# Patient Record
Sex: MALE | Race: WHITE | NOT HISPANIC OR LATINO | Employment: FULL TIME | URBAN - METROPOLITAN AREA
[De-identification: names, ages, dates, MRNs, and addresses within clinical notes are randomized per-mention and may not be internally consistent; named-entity substitution may affect disease eponyms.]

---

## 2023-02-17 PROBLEM — R31.9 HEMATURIA: Status: ACTIVE | Noted: 2023-02-17

## 2023-02-17 PROBLEM — F10.10 ALCOHOL ABUSE: Status: ACTIVE | Noted: 2023-02-17

## 2023-02-17 PROBLEM — M40.209 KYPHOSIS, ACQUIRED: Status: ACTIVE | Noted: 2023-02-17

## 2023-02-17 PROBLEM — F13.939: Status: ACTIVE | Noted: 2023-02-17

## 2023-02-17 PROBLEM — R14.0 ABDOMINAL BLOATING: Status: ACTIVE | Noted: 2023-02-17

## 2023-02-17 PROBLEM — Z86.0100 PERSONAL HISTORY OF COLONIC POLYPS: Status: ACTIVE | Noted: 2023-02-17

## 2023-02-17 PROBLEM — N52.9 MALE ERECTILE DISORDER OF ORGANIC ORIGIN: Status: ACTIVE | Noted: 2023-02-17

## 2023-02-17 PROBLEM — R19.5 LOOSE BOWEL MOVEMENTS: Status: ACTIVE | Noted: 2023-02-17

## 2023-02-17 PROBLEM — J01.90 ACUTE SINUSITIS: Status: ACTIVE | Noted: 2023-02-17

## 2023-02-17 PROBLEM — D12.6 TUBULAR ADENOMA OF COLON: Status: ACTIVE | Noted: 2023-02-17

## 2023-02-17 PROBLEM — I10 ESSENTIAL HYPERTENSION: Status: ACTIVE | Noted: 2023-02-17

## 2023-02-17 PROBLEM — N13.8 BPH WITH OBSTRUCTION/LOWER URINARY TRACT SYMPTOMS: Status: ACTIVE | Noted: 2023-02-17

## 2023-02-17 PROBLEM — K64.4 EXTERNAL HEMORRHOIDS: Status: ACTIVE | Noted: 2023-02-17

## 2023-02-17 PROBLEM — E55.9 VITAMIN D DEFICIENCY: Status: ACTIVE | Noted: 2023-02-17

## 2023-02-17 PROBLEM — L30.9 ECZEMA: Status: ACTIVE | Noted: 2023-02-17

## 2023-02-17 PROBLEM — M85.80 OSTEOPENIA: Status: ACTIVE | Noted: 2023-02-17

## 2023-02-17 PROBLEM — Z86.010 PERSONAL HISTORY OF COLONIC POLYPS: Status: ACTIVE | Noted: 2023-02-17

## 2023-02-17 PROBLEM — R60.9 EDEMA: Status: ACTIVE | Noted: 2023-02-17

## 2023-02-17 PROBLEM — K58.9 IRRITABLE BOWEL SYNDROME: Status: ACTIVE | Noted: 2023-02-17

## 2023-02-17 PROBLEM — J30.9 ALLERGIC RHINITIS: Status: ACTIVE | Noted: 2023-02-17

## 2023-02-17 PROBLEM — J98.8 RESPIRATORY TRACT INFECTION DUE TO COVID-19 VIRUS: Status: ACTIVE | Noted: 2023-02-17

## 2023-02-17 PROBLEM — K57.30 DIVERTICULOSIS OF COLON: Status: ACTIVE | Noted: 2023-02-17

## 2023-02-17 PROBLEM — R93.1 AGATSTON CORONARY ARTERY CALCIUM SCORE BETWEEN 200 AND 399: Status: ACTIVE | Noted: 2023-02-17

## 2023-02-17 PROBLEM — Z97.3 WEARS GLASSES: Status: ACTIVE | Noted: 2023-02-17

## 2023-02-17 PROBLEM — M19.90 ARTHRITIS: Status: ACTIVE | Noted: 2023-02-17

## 2023-02-17 PROBLEM — K52.9 CHRONIC DIARRHEA: Status: ACTIVE | Noted: 2023-02-17

## 2023-02-17 PROBLEM — B35.6 TINEA CRURIS: Status: ACTIVE | Noted: 2023-02-17

## 2023-02-17 PROBLEM — U07.1 RESPIRATORY TRACT INFECTION DUE TO COVID-19 VIRUS: Status: ACTIVE | Noted: 2023-02-17

## 2023-02-17 PROBLEM — R43.0 ANOSMIA: Status: ACTIVE | Noted: 2023-02-17

## 2023-02-17 PROBLEM — M12.50 TRAUMATIC ARTHROPATHY: Status: ACTIVE | Noted: 2023-02-17

## 2023-02-17 PROBLEM — I25.10 ARTERIOSCLEROSIS OF CORONARY ARTERY: Status: ACTIVE | Noted: 2023-02-17

## 2023-02-17 PROBLEM — E78.5 HYPERLIPIDEMIA: Status: ACTIVE | Noted: 2023-02-17

## 2023-02-17 PROBLEM — N40.1 BPH WITH OBSTRUCTION/LOWER URINARY TRACT SYMPTOMS: Status: ACTIVE | Noted: 2023-02-17

## 2023-02-17 PROBLEM — D64.9 ANEMIA: Status: ACTIVE | Noted: 2023-02-17

## 2023-02-17 PROBLEM — R21 RASH: Status: ACTIVE | Noted: 2023-02-17

## 2023-02-17 PROBLEM — F10.20: Status: ACTIVE | Noted: 2023-02-17

## 2023-02-17 PROBLEM — F41.8 SITUATIONAL ANXIETY: Status: ACTIVE | Noted: 2023-02-17

## 2023-02-17 PROBLEM — E66.3 OVERWEIGHT (BMI 25.0-29.9): Status: ACTIVE | Noted: 2023-02-17

## 2023-02-17 PROBLEM — R31.29 MICROSCOPIC HEMATURIA: Status: ACTIVE | Noted: 2023-02-17

## 2023-02-17 PROBLEM — R82.998 CALCIUM OXALATE CRYSTALS IN URINE: Status: ACTIVE | Noted: 2023-02-17

## 2023-02-17 PROBLEM — J06.9 UPPER RESPIRATORY INFECTION, ACUTE: Status: ACTIVE | Noted: 2023-02-17

## 2023-02-17 RX ORDER — GABAPENTIN 300 MG/1
1 CAPSULE ORAL 3 TIMES DAILY
COMMUNITY
Start: 2022-01-25 | End: 2023-04-13 | Stop reason: SDUPTHER

## 2023-02-17 RX ORDER — WHEAT DEXTRIN 3 G/3.5 G
POWDER IN PACKET (EA) ORAL 2 TIMES DAILY
COMMUNITY
Start: 2021-09-15

## 2023-02-17 RX ORDER — ATORVASTATIN CALCIUM 20 MG/1
1 TABLET, FILM COATED ORAL DAILY
COMMUNITY
Start: 2018-04-13 | End: 2023-03-29

## 2023-02-17 RX ORDER — ACETAMINOPHEN 500 MG
1 TABLET ORAL DAILY
COMMUNITY
Start: 2020-06-16

## 2023-02-17 RX ORDER — FOLIC ACID 1 MG/1
1 TABLET ORAL DAILY
COMMUNITY
Start: 2021-11-12 | End: 2023-08-18

## 2023-02-17 RX ORDER — MULTIVITAMIN
1 TABLET ORAL DAILY
COMMUNITY
Start: 2017-12-22 | End: 2024-05-16 | Stop reason: WASHOUT

## 2023-02-17 RX ORDER — FLUOCINOLONE ACETONIDE 0.25 MG/G
CREAM TOPICAL 2 TIMES DAILY
COMMUNITY
Start: 2015-11-02 | End: 2024-05-16 | Stop reason: SDUPTHER

## 2023-02-17 RX ORDER — TADALAFIL 5 MG/1
1 TABLET ORAL DAILY
COMMUNITY
Start: 2019-05-03 | End: 2023-07-12 | Stop reason: SDUPTHER

## 2023-02-17 RX ORDER — FLUTICASONE PROPIONATE 50 MCG
1 SPRAY, SUSPENSION (ML) NASAL 2 TIMES DAILY
COMMUNITY
Start: 2016-02-05 | End: 2023-03-29

## 2023-03-29 DIAGNOSIS — E78.5 DYSLIPIDEMIA, GOAL LDL BELOW 70: ICD-10-CM

## 2023-03-29 DIAGNOSIS — J30.1 SEASONAL ALLERGIC RHINITIS DUE TO POLLEN: Primary | ICD-10-CM

## 2023-03-29 RX ORDER — FLUTICASONE PROPIONATE 50 MCG
SPRAY, SUSPENSION (ML) NASAL
Qty: 48 G | Refills: 0 | Status: SHIPPED | OUTPATIENT
Start: 2023-03-29 | End: 2024-05-16 | Stop reason: SDUPTHER

## 2023-03-29 RX ORDER — ATORVASTATIN CALCIUM 20 MG/1
TABLET, FILM COATED ORAL
Qty: 90 TABLET | Refills: 1 | Status: SHIPPED | OUTPATIENT
Start: 2023-03-29 | End: 2023-09-29 | Stop reason: SDUPTHER

## 2023-04-04 ENCOUNTER — OFFICE VISIT (OUTPATIENT)
Dept: PRIMARY CARE | Facility: CLINIC | Age: 68
End: 2023-04-04
Payer: COMMERCIAL

## 2023-04-04 VITALS
OXYGEN SATURATION: 95 % | TEMPERATURE: 98.3 F | RESPIRATION RATE: 16 BRPM | DIASTOLIC BLOOD PRESSURE: 83 MMHG | BODY MASS INDEX: 32.61 KG/M2 | WEIGHT: 208.2 LBS | HEART RATE: 65 BPM | SYSTOLIC BLOOD PRESSURE: 129 MMHG

## 2023-04-04 DIAGNOSIS — I10 ESSENTIAL HYPERTENSION WITH GOAL BLOOD PRESSURE LESS THAN 130/85: ICD-10-CM

## 2023-04-04 DIAGNOSIS — N13.8 BPH WITH OBSTRUCTION/LOWER URINARY TRACT SYMPTOMS: ICD-10-CM

## 2023-04-04 DIAGNOSIS — J30.1 SEASONAL ALLERGIC RHINITIS DUE TO POLLEN: ICD-10-CM

## 2023-04-04 DIAGNOSIS — E78.5 HYPERLIPIDEMIA, UNSPECIFIED HYPERLIPIDEMIA TYPE: ICD-10-CM

## 2023-04-04 DIAGNOSIS — F10.20: ICD-10-CM

## 2023-04-04 DIAGNOSIS — E66.9 CLASS 1 OBESITY WITH SERIOUS COMORBIDITY AND BODY MASS INDEX (BMI) OF 32.0 TO 32.9 IN ADULT, UNSPECIFIED OBESITY TYPE: ICD-10-CM

## 2023-04-04 DIAGNOSIS — N40.1 BPH WITH OBSTRUCTION/LOWER URINARY TRACT SYMPTOMS: ICD-10-CM

## 2023-04-04 DIAGNOSIS — M19.049 HAND ARTHRITIS: ICD-10-CM

## 2023-04-04 DIAGNOSIS — Z71.85 IMMUNIZATION COUNSELING: Primary | ICD-10-CM

## 2023-04-04 DIAGNOSIS — G62.1 ALCOHOL-INDUCED POLYNEUROPATHY (MULTI): ICD-10-CM

## 2023-04-04 DIAGNOSIS — E55.9 VITAMIN D DEFICIENCY: ICD-10-CM

## 2023-04-04 DIAGNOSIS — D64.9 ANEMIA, UNSPECIFIED TYPE: ICD-10-CM

## 2023-04-04 DIAGNOSIS — I10 ESSENTIAL HYPERTENSION: ICD-10-CM

## 2023-04-04 DIAGNOSIS — E78.5 DYSLIPIDEMIA, GOAL LDL BELOW 100: ICD-10-CM

## 2023-04-04 PROBLEM — F13.939: Status: RESOLVED | Noted: 2023-02-17 | Resolved: 2023-04-04

## 2023-04-04 PROCEDURE — 90471 IMMUNIZATION ADMIN: CPT | Performed by: INTERNAL MEDICINE

## 2023-04-04 PROCEDURE — 90677 PCV20 VACCINE IM: CPT | Performed by: INTERNAL MEDICINE

## 2023-04-04 PROCEDURE — 3079F DIAST BP 80-89 MM HG: CPT | Performed by: INTERNAL MEDICINE

## 2023-04-04 PROCEDURE — 99214 OFFICE O/P EST MOD 30 MIN: CPT | Performed by: INTERNAL MEDICINE

## 2023-04-04 PROCEDURE — 1159F MED LIST DOCD IN RCRD: CPT | Performed by: INTERNAL MEDICINE

## 2023-04-04 PROCEDURE — 3074F SYST BP LT 130 MM HG: CPT | Performed by: INTERNAL MEDICINE

## 2023-04-04 PROCEDURE — 1160F RVW MEDS BY RX/DR IN RCRD: CPT | Performed by: INTERNAL MEDICINE

## 2023-04-04 PROCEDURE — 3008F BODY MASS INDEX DOCD: CPT | Performed by: INTERNAL MEDICINE

## 2023-04-04 PROCEDURE — 1036F TOBACCO NON-USER: CPT | Performed by: INTERNAL MEDICINE

## 2023-04-04 ASSESSMENT — ENCOUNTER SYMPTOMS
LOSS OF SENSATION IN FEET: 0
DEPRESSION: 0
OCCASIONAL FEELINGS OF UNSTEADINESS: 0

## 2023-04-04 ASSESSMENT — PATIENT HEALTH QUESTIONNAIRE - PHQ9
SUM OF ALL RESPONSES TO PHQ9 QUESTIONS 1 AND 2: 0
2. FEELING DOWN, DEPRESSED OR HOPELESS: NOT AT ALL
1. LITTLE INTEREST OR PLEASURE IN DOING THINGS: NOT AT ALL

## 2023-04-04 NOTE — PROGRESS NOTES
Subjective   Patient ID: Ronny Read is a 67 y.o. male who presents for Follow-up.    Here for semiannual visit.  For the most part doing well.  Working from home full-time    He Has a New Puppy-[employer]    He notes his hands are stiff at times-    At night he notes his feet burn.  It feels like they are thick between the toes         Review of Systems    Objective   /83 (BP Location: Left arm, Patient Position: Sitting)   Pulse 65   Temp 36.8 °C (98.3 °F)   Resp 16   Wt 94.4 kg (208 lb 3.2 oz)   SpO2 95%   BMI 32.61 kg/m²     Physical Exam  Constitutional:       Appearance: Normal appearance.   HENT:      Head: Normocephalic and atraumatic.      Right Ear: Tympanic membrane normal.      Nose: Nose normal.   Eyes:      General: No scleral icterus.     Extraocular Movements: Extraocular movements intact.      Conjunctiva/sclera: Conjunctivae normal.      Pupils: Pupils are equal, round, and reactive to light.   Cardiovascular:      Rate and Rhythm: Normal rate and regular rhythm.      Pulses: Normal pulses.      Heart sounds: Normal heart sounds. No murmur heard.  Pulmonary:      Effort: Pulmonary effort is normal. No respiratory distress.      Breath sounds: Normal breath sounds. No stridor. No wheezing.   Abdominal:      General: Abdomen is flat. Bowel sounds are normal. There is no distension.      Palpations: Abdomen is soft. There is no mass.      Tenderness: There is no abdominal tenderness. There is no guarding.   Musculoskeletal:         General: No swelling, tenderness or deformity. Normal range of motion.      Cervical back: Normal range of motion and neck supple. No tenderness.   Lymphadenopathy:      Cervical: No cervical adenopathy.   Skin:     General: Skin is warm and dry.      Findings: No lesion or rash.   Neurological:      General: No focal deficit present.      Mental Status: He is alert and oriented to person, place, and time.      Cranial Nerves: No cranial nerve deficit.       Motor: No weakness.   Psychiatric:         Mood and Affect: Mood normal.         Behavior: Behavior normal.         Thought Content: Thought content normal.         Judgment: Judgment normal.         Assessment/Plan   Problem List Items Addressed This Visit          Circulatory    Essential hypertension    Relevant Orders    Comprehensive Metabolic Panel       Genitourinary    BPH with obstruction/lower urinary tract symptoms    Relevant Orders    Prostate Specific Antigen       Endocrine/Metabolic    Vitamin D deficiency    Relevant Orders    Vitamin D, Total       Hematologic    Anemia    Relevant Orders    CBC    Comprehensive Metabolic Panel    Vitamin B12    Folate    Iron and TIBC    TSH with reflex to Free T4 if abnormal       Other    Alcohol dependence with inpatient treatment (CMS/[facility])    Allergic rhinitis    RESOLVED: Hyperlipidemia    Relevant Orders    Lipid Panel    TSH with reflex to Free T4 if abnormal     Other Visit Diagnoses       Immunization counseling    -  Primary    Relevant Medications    zoster vaccine-recombinant adjuvanted (Shingrix) 50 mcg/0.5 mL vaccine    Other Relevant Orders    Pneumococcal conjugate vaccine, 20-valent, adult (PREVNAR 20) (Completed)    Class 1 obesity with serious comorbidity and body mass index (BMI) of 32.0 to 32.9 in adult, unspecified obesity type        Dyslipidemia, goal LDL below 100        Essential hypertension with goal blood pressure less than 130/85        Hand arthritis        Alcohol-induced polyneuropathy (CMS/[facility])                     Annual fasting labs-he will do      Elevated blood pressure/elevated weight/dyslipidemia-he has been reluctant to use blood pressure medicine in the past.  He will continue statin and efforts at weight loss.    Exercise routine-not doing much at this time.  Encouraged working to get into her dog walking routine now that he has a new puppy    History of alcoholism-he remains abstinent    Anxiety-gabapentin has been  very helpful-he will continue    Hand stiffness-consistent with osteoarthritis with Heberden's nodes and several DIP joints    Peripheral neuropathy-some burning symptoms at night in his feet-discussed the likelihood of alcoholic related neuropathy.  Fortunately he has discontinued alcohol.  He will continue gabapentin    Colon cancer screening-we will continue colonoscopy surveillance    BPH-annual PSA continues for prostate cancer screening. Nocturia not a present concern.  Daily Cialis helpful with his symptoms    Allergic rhinitis-reviewed importance of using allergy medications as above consistently during their allergy season(s), and call if allergy symptoms are not well controlled on this regimen.    Dental care-encouraged semiannual dental visits.    Shingrix-encouraged-he will do this at his pharmacy at some point.  Order provided    Prevnar 20-updated today    Follow-up 6 months sooner as needed

## 2023-04-05 PROBLEM — R31.9 HEMATURIA: Status: RESOLVED | Noted: 2023-02-17 | Resolved: 2023-04-05

## 2023-04-05 PROBLEM — E78.5 HYPERLIPIDEMIA: Status: RESOLVED | Noted: 2023-02-17 | Resolved: 2023-04-05

## 2023-04-13 DIAGNOSIS — F41.8 SITUATIONAL ANXIETY: Primary | ICD-10-CM

## 2023-04-15 RX ORDER — GABAPENTIN 300 MG/1
300 CAPSULE ORAL 3 TIMES DAILY
Qty: 270 CAPSULE | Refills: 0 | Status: SHIPPED | OUTPATIENT
Start: 2023-04-15 | End: 2023-07-20 | Stop reason: SDUPTHER

## 2023-07-12 DIAGNOSIS — N52.1 ERECTILE DISORDER DUE TO MEDICAL CONDITION IN MALE: Primary | ICD-10-CM

## 2023-07-12 NOTE — TELEPHONE ENCOUNTER
Pt has new insurance and needs prescription authorized with [facility] - Tadalafil 5mg  - (keycode (?) [id number])  New Insurance is [facility] - pt faxing card over

## 2023-07-13 RX ORDER — TADALAFIL 5 MG/1
5 TABLET ORAL DAILY
Qty: 30 TABLET | Refills: 3 | Status: SHIPPED | OUTPATIENT
Start: 2023-07-13 | End: 2023-07-24

## 2023-07-20 DIAGNOSIS — F41.8 SITUATIONAL ANXIETY: ICD-10-CM

## 2023-07-20 RX ORDER — GABAPENTIN 300 MG/1
300 CAPSULE ORAL 3 TIMES DAILY
Qty: 270 CAPSULE | Refills: 0 | Status: SHIPPED | OUTPATIENT
Start: 2023-07-20 | End: 2023-10-06 | Stop reason: SDUPTHER

## 2023-07-24 DIAGNOSIS — N40.1 BPH WITH OBSTRUCTION/LOWER URINARY TRACT SYMPTOMS: Primary | ICD-10-CM

## 2023-07-24 DIAGNOSIS — N13.8 BPH WITH OBSTRUCTION/LOWER URINARY TRACT SYMPTOMS: Primary | ICD-10-CM

## 2023-07-24 DIAGNOSIS — N52.1 ERECTILE DISORDER DUE TO MEDICAL CONDITION IN MALE: ICD-10-CM

## 2023-07-24 RX ORDER — TADALAFIL 5 MG/1
5 TABLET ORAL DAILY
Qty: 90 TABLET | Refills: 3 | Status: SHIPPED | OUTPATIENT
Start: 2023-07-24

## 2023-07-25 ENCOUNTER — TELEPHONE (OUTPATIENT)
Dept: PRIMARY CARE | Facility: CLINIC | Age: 68
End: 2023-07-25
Payer: COMMERCIAL

## 2023-08-18 DIAGNOSIS — F10.20: Primary | ICD-10-CM

## 2023-08-18 RX ORDER — FOLIC ACID 1 MG/1
1 TABLET ORAL DAILY
Qty: 90 TABLET | Refills: 3 | Status: SHIPPED | OUTPATIENT
Start: 2023-08-18

## 2023-08-18 RX ORDER — LANOLIN ALCOHOL/MO/W.PET/CERES
1000 CREAM (GRAM) TOPICAL DAILY
Qty: 90 TABLET | Refills: 3 | Status: SHIPPED | OUTPATIENT
Start: 2023-08-18

## 2023-09-29 ENCOUNTER — LAB (OUTPATIENT)
Dept: LAB | Facility: LAB | Age: 68
End: 2023-09-29
Payer: COMMERCIAL

## 2023-09-29 DIAGNOSIS — E78.5 HYPERLIPIDEMIA, UNSPECIFIED HYPERLIPIDEMIA TYPE: ICD-10-CM

## 2023-09-29 DIAGNOSIS — N40.1 BPH WITH OBSTRUCTION/LOWER URINARY TRACT SYMPTOMS: ICD-10-CM

## 2023-09-29 DIAGNOSIS — I10 ESSENTIAL HYPERTENSION: ICD-10-CM

## 2023-09-29 DIAGNOSIS — D64.9 ANEMIA, UNSPECIFIED TYPE: ICD-10-CM

## 2023-09-29 DIAGNOSIS — E78.5 DYSLIPIDEMIA, GOAL LDL BELOW 70: ICD-10-CM

## 2023-09-29 DIAGNOSIS — E55.9 VITAMIN D DEFICIENCY: ICD-10-CM

## 2023-09-29 DIAGNOSIS — N13.8 BPH WITH OBSTRUCTION/LOWER URINARY TRACT SYMPTOMS: ICD-10-CM

## 2023-09-29 LAB
ALANINE AMINOTRANSFERASE (SGPT) (U/L) IN SER/PLAS: 15 U/L (ref 10–52)
ALBUMIN (G/DL) IN SER/PLAS: 4 G/DL (ref 3.4–5)
ALKALINE PHOSPHATASE (U/L) IN SER/PLAS: 79 U/L (ref 33–136)
ANION GAP IN SER/PLAS: 16 MMOL/L (ref 10–20)
ASPARTATE AMINOTRANSFERASE (SGOT) (U/L) IN SER/PLAS: 14 U/L (ref 9–39)
BILIRUBIN TOTAL (MG/DL) IN SER/PLAS: 0.9 MG/DL (ref 0–1.2)
CALCIDIOL (25 OH VITAMIN D3) (NG/ML) IN SER/PLAS: 40 NG/ML
CALCIUM (MG/DL) IN SER/PLAS: 9.5 MG/DL (ref 8.6–10.3)
CARBON DIOXIDE, TOTAL (MMOL/L) IN SER/PLAS: 27 MMOL/L (ref 21–32)
CHLORIDE (MMOL/L) IN SER/PLAS: 103 MMOL/L (ref 98–107)
CHOLESTEROL (MG/DL) IN SER/PLAS: 167 MG/DL (ref 0–199)
CHOLESTEROL IN HDL (MG/DL) IN SER/PLAS: 53.4 MG/DL
CHOLESTEROL/HDL RATIO: 3.1
COBALAMIN (VITAMIN B12) (PG/ML) IN SER/PLAS: 672 PG/ML (ref 211–911)
CREATININE (MG/DL) IN SER/PLAS: 1.16 MG/DL (ref 0.5–1.3)
ERYTHROCYTE DISTRIBUTION WIDTH (RATIO) BY AUTOMATED COUNT: 12.8 % (ref 11.5–14.5)
ERYTHROCYTE MEAN CORPUSCULAR HEMOGLOBIN CONCENTRATION (G/DL) BY AUTOMATED: 33.8 G/DL (ref 32–36)
ERYTHROCYTE MEAN CORPUSCULAR VOLUME (FL) BY AUTOMATED COUNT: 95 FL (ref 80–100)
ERYTHROCYTES (10*6/UL) IN BLOOD BY AUTOMATED COUNT: 4.91 X10E12/L (ref 4.5–5.9)
FOLATE (NG/ML) IN SER/PLAS: >22.3 NG/ML
GFR MALE: 69 ML/MIN/1.73M2
GLUCOSE (MG/DL) IN SER/PLAS: 131 MG/DL (ref 74–99)
HEMATOCRIT (%) IN BLOOD BY AUTOMATED COUNT: 46.5 % (ref 41–52)
HEMOGLOBIN (G/DL) IN BLOOD: 15.7 G/DL (ref 13.5–17.5)
IRON (UG/DL) IN SER/PLAS: 168 UG/DL (ref 35–150)
IRON BINDING CAPACITY (UG/DL) IN SER/PLAS: 338 UG/DL (ref 240–445)
IRON SATURATION (%) IN SER/PLAS: 50 % (ref 25–45)
LDL: 77 MG/DL (ref 0–99)
LEUKOCYTES (10*3/UL) IN BLOOD BY AUTOMATED COUNT: 7.8 X10E9/L (ref 4.4–11.3)
PLATELETS (10*3/UL) IN BLOOD AUTOMATED COUNT: 237 X10E9/L (ref 150–450)
POTASSIUM (MMOL/L) IN SER/PLAS: 4.6 MMOL/L (ref 3.5–5.3)
PROSTATE SPECIFIC AG (NG/ML) IN SER/PLAS: 0.95 NG/ML (ref 0–4)
PROTEIN TOTAL: 7 G/DL (ref 6.4–8.2)
SODIUM (MMOL/L) IN SER/PLAS: 141 MMOL/L (ref 136–145)
THYROTROPIN (MIU/L) IN SER/PLAS BY DETECTION LIMIT <= 0.05 MIU/L: 2.21 MIU/L (ref 0.44–3.98)
TRIGLYCERIDE (MG/DL) IN SER/PLAS: 185 MG/DL (ref 0–149)
UREA NITROGEN (MG/DL) IN SER/PLAS: 16 MG/DL (ref 6–23)
VLDL: 37 MG/DL (ref 0–40)

## 2023-09-29 PROCEDURE — 82306 VITAMIN D 25 HYDROXY: CPT

## 2023-09-29 PROCEDURE — 82746 ASSAY OF FOLIC ACID SERUM: CPT

## 2023-09-29 PROCEDURE — 84443 ASSAY THYROID STIM HORMONE: CPT

## 2023-09-29 PROCEDURE — 82607 VITAMIN B-12: CPT

## 2023-09-29 PROCEDURE — 80061 LIPID PANEL: CPT

## 2023-09-29 PROCEDURE — 36415 COLL VENOUS BLD VENIPUNCTURE: CPT

## 2023-09-29 PROCEDURE — 83540 ASSAY OF IRON: CPT

## 2023-09-29 PROCEDURE — 80053 COMPREHEN METABOLIC PANEL: CPT

## 2023-09-29 PROCEDURE — 84153 ASSAY OF PSA TOTAL: CPT

## 2023-09-29 PROCEDURE — 83550 IRON BINDING TEST: CPT

## 2023-09-29 PROCEDURE — 85027 COMPLETE CBC AUTOMATED: CPT

## 2023-09-29 RX ORDER — ATORVASTATIN CALCIUM 20 MG/1
20 TABLET, FILM COATED ORAL DAILY
Qty: 90 TABLET | Refills: 3 | Status: SHIPPED | OUTPATIENT
Start: 2023-09-29

## 2023-10-02 ASSESSMENT — PROMIS GLOBAL HEALTH SCALE
CARRYOUT_SOCIAL_ACTIVITIES: GOOD
RATE_MENTAL_HEALTH: VERY GOOD
CARRYOUT_PHYSICAL_ACTIVITIES: MODERATELY
RATE_SOCIAL_SATISFACTION: VERY GOOD
RATE_AVERAGE_PAIN: 5
RATE_QUALITY_OF_LIFE: VERY GOOD
RATE_PHYSICAL_HEALTH: GOOD
RATE_AVERAGE_FATIGUE: MODERATE
RATE_GENERAL_HEALTH: GOOD
EMOTIONAL_PROBLEMS: SOMETIMES

## 2023-10-06 ENCOUNTER — OFFICE VISIT (OUTPATIENT)
Dept: PRIMARY CARE | Facility: CLINIC | Age: 68
End: 2023-10-06
Payer: COMMERCIAL

## 2023-10-06 VITALS
SYSTOLIC BLOOD PRESSURE: 136 MMHG | TEMPERATURE: 98.9 F | HEART RATE: 58 BPM | RESPIRATION RATE: 16 BRPM | BODY MASS INDEX: 32.74 KG/M2 | WEIGHT: 216 LBS | HEIGHT: 68 IN | OXYGEN SATURATION: 95 % | DIASTOLIC BLOOD PRESSURE: 86 MMHG

## 2023-10-06 DIAGNOSIS — Z71.85 IMMUNIZATION COUNSELING: Chronic | ICD-10-CM

## 2023-10-06 DIAGNOSIS — M15.9 OSTEOARTHRITIS OF MULTIPLE JOINTS, UNSPECIFIED OSTEOARTHRITIS TYPE: Chronic | ICD-10-CM

## 2023-10-06 DIAGNOSIS — G62.1 ALCOHOL-INDUCED POLYNEUROPATHY (MULTI): Primary | Chronic | ICD-10-CM

## 2023-10-06 DIAGNOSIS — Z23 NEED FOR INFLUENZA VACCINATION: Chronic | ICD-10-CM

## 2023-10-06 DIAGNOSIS — R73.01 ELEVATED FASTING GLUCOSE: Chronic | ICD-10-CM

## 2023-10-06 DIAGNOSIS — E78.5 DYSLIPIDEMIA: Chronic | ICD-10-CM

## 2023-10-06 DIAGNOSIS — D12.6 ADENOMATOUS POLYP OF COLON, UNSPECIFIED PART OF COLON: Chronic | ICD-10-CM

## 2023-10-06 DIAGNOSIS — F41.8 SITUATIONAL ANXIETY: Chronic | ICD-10-CM

## 2023-10-06 DIAGNOSIS — Z00.00 HEALTHCARE MAINTENANCE: Chronic | ICD-10-CM

## 2023-10-06 PROCEDURE — 1159F MED LIST DOCD IN RCRD: CPT | Performed by: INTERNAL MEDICINE

## 2023-10-06 PROCEDURE — 90662 IIV NO PRSV INCREASED AG IM: CPT | Performed by: INTERNAL MEDICINE

## 2023-10-06 PROCEDURE — 99397 PER PM REEVAL EST PAT 65+ YR: CPT | Performed by: INTERNAL MEDICINE

## 2023-10-06 PROCEDURE — 1126F AMNT PAIN NOTED NONE PRSNT: CPT | Performed by: INTERNAL MEDICINE

## 2023-10-06 PROCEDURE — 3079F DIAST BP 80-89 MM HG: CPT | Performed by: INTERNAL MEDICINE

## 2023-10-06 PROCEDURE — 90471 IMMUNIZATION ADMIN: CPT | Performed by: INTERNAL MEDICINE

## 2023-10-06 PROCEDURE — 3075F SYST BP GE 130 - 139MM HG: CPT | Performed by: INTERNAL MEDICINE

## 2023-10-06 PROCEDURE — 1160F RVW MEDS BY RX/DR IN RCRD: CPT | Performed by: INTERNAL MEDICINE

## 2023-10-06 PROCEDURE — 3008F BODY MASS INDEX DOCD: CPT | Performed by: INTERNAL MEDICINE

## 2023-10-06 PROCEDURE — 1036F TOBACCO NON-USER: CPT | Performed by: INTERNAL MEDICINE

## 2023-10-06 RX ORDER — GABAPENTIN 300 MG/1
300 CAPSULE ORAL 3 TIMES DAILY
Qty: 270 CAPSULE | Refills: 0 | Status: CANCELLED | OUTPATIENT
Start: 2023-10-06

## 2023-10-06 RX ORDER — GABAPENTIN 300 MG/1
300 CAPSULE ORAL 3 TIMES DAILY
Qty: 270 CAPSULE | Refills: 0 | Status: SHIPPED | OUTPATIENT
Start: 2023-10-06 | End: 2024-01-05 | Stop reason: SDUPTHER

## 2023-10-06 ASSESSMENT — ENCOUNTER SYMPTOMS
OCCASIONAL FEELINGS OF UNSTEADINESS: 0
LOSS OF SENSATION IN FEET: 0
DEPRESSION: 0

## 2023-10-06 NOTE — PROGRESS NOTES
"Subjective   Patient ID: Ronny Read is a 68 y.o. male who presents for Annual Exam.    Here for wellness visit.  Overall feeling well.  Remains active though he still working weekdays from home         Review of Systems    Objective   /86   Pulse 58   Temp 37.2 °C (98.9 °F)   Resp 16   Ht 1.715 m (5' 7.5\")   Wt 98 kg (216 lb)   SpO2 95%   BMI 33.33 kg/m²     Physical Exam  Constitutional:       Appearance: Normal appearance.   HENT:      Head: Normocephalic and atraumatic.      Right Ear: Tympanic membrane normal.      Nose: Nose normal.   Eyes:      General: No scleral icterus.     Extraocular Movements: Extraocular movements intact.      Conjunctiva/sclera: Conjunctivae normal.      Pupils: Pupils are equal, round, and reactive to light.   Cardiovascular:      Rate and Rhythm: Normal rate and regular rhythm.      Pulses: Normal pulses.      Heart sounds: Normal heart sounds. No murmur heard.  Pulmonary:      Effort: Pulmonary effort is normal. No respiratory distress.      Breath sounds: Normal breath sounds. No stridor. No wheezing.   Abdominal:      General: Abdomen is flat. Bowel sounds are normal. There is no distension.      Palpations: Abdomen is soft. There is no mass.      Tenderness: There is no abdominal tenderness. There is no guarding.   Musculoskeletal:         General: No swelling, tenderness or deformity. Normal range of motion.      Cervical back: Normal range of motion and neck supple. No tenderness.      Comments: He walked a bit flexed at the hips consistent with lumbar arthritis, Heberden's nodes noted fingers   Lymphadenopathy:      Cervical: No cervical adenopathy.   Skin:     General: Skin is warm and dry.      Findings: No lesion or rash.   Neurological:      General: No focal deficit present.      Mental Status: He is alert and oriented to person, place, and time.      Cranial Nerves: No cranial nerve deficit.      Motor: No weakness.   Psychiatric:         Mood and " Affect: Mood normal.         Behavior: Behavior normal.         Thought Content: Thought content normal.         Judgment: Judgment normal.         Assessment/Plan   Problem List Items Addressed This Visit             ICD-10-CM    Dyslipidemia E78.5    Relevant Orders    Lipid Panel    Situational anxiety F41.8    Relevant Medications    gabapentin (Neurontin) 300 mg capsule    Adenomatous polyp of colon D12.6    Relevant Orders    Colonoscopy Diagnostic; w Polypectomy    Alcohol-induced polyneuropathy (CMS/HCC) - Primary G62.1    Need for influenza vaccination Z23    Relevant Orders    Flu vaccine, quadrivalent, high-dose, preservative free, age 65y+ (FLUZONE) (Completed)    Immunization counseling Z71.85    Relevant Medications    zoster vaccine-recombinant adjuvanted (Shingrix) 50 mcg/0.5 mL vaccine    Elevated fasting glucose R73.01    Relevant Orders    Hemoglobin A1C    Basic Metabolic Panel    Osteoarthritis of multiple joints M15.9     Living situation - he and his wife and dog live in a 2 story house - bedroom and home office on the 2nd floor, laundry and dog food    Elevated blood pressure/elevated weight/dyslipidemia-he has in the past had lightheadedness with a blood pressure medicine-and has refused any additional blood pressure medicine since.    He will continue statin and efforts at weight loss.          Goal LDL < 100;                Sep '23 - LDL 77 unfortunately his weight stable at BMI 33 from a year ago.  Fortunately blood pressure came down a bit on recheck but it was pretty high initially.  Discussed elevated blood pressure-at this point he still refuses to consider medications but I told him eventually he will need to reconsider medications as his blood pressure will increase with aging    Elevated glucose - he had a soda 8 hrs before. Will follow with fasting labs before next visit.    Exercise routine-not doing much at this time.  Encouraged working to get into her dog walking routine now  that he has a new puppy    History of alcoholism-he remains abstinent    Anxiety-gabapentin has been very helpful-he will continue    Osteoarthritis-hand stiffness-consistent with osteoarthritis with Heberden's nodes and several DIP joints.  Encouraged heat and heating pad in the morning    Lumbar arthritis-his gait suggest this with his forward flexion stance.  Fortunately not problematic.    Peripheral neuropathy-some burning symptoms at night in his feet-discussed the likelihood of alcoholic related neuropathy.  Fortunately he has discontinued alcohol.  He will continue gabapentin    Colon polyp - updated Aug '17; next in 5 yrs - Aug '22.          ordered    BPH-annual PSA continues for prostate cancer screening. Nocturia not a present concern.  Daily Cialis helpful with his symptoms. PSA ok Sep '23    Allergic rhinitis-reviewed importance of using allergy medications as above consistently during their allergy season(s), and call if allergy symptoms are not well controlled on this regimen.    Ear eczema - prn fluocinolone cream helps    Glasses - Hx bilat cataracts. new reading glasses summer '23    Dental care-encouraged semiannual dental visits.    Shingrix-encouraged-he will do this at his pharmacy at some point.  Order provided          10/23 -Not yet done. Encouraged him to do    Prevnar 20-updated Apr '23    Flu shot each fall; updated 10/6/23    Covid booster - encouraged this fall        Follow-up 6 months sooner as needed

## 2023-10-20 ENCOUNTER — TELEPHONE (OUTPATIENT)
Dept: PRIMARY CARE | Facility: CLINIC | Age: 68
End: 2023-10-20
Payer: COMMERCIAL

## 2023-10-20 NOTE — TELEPHONE ENCOUNTER
Patient asking if his colonoscopy referral/order can be updated to screening vs diagnostic.  Patient advised that his insurance company won't cover a diagnostic test.    Please advise [phone]

## 2023-10-23 DIAGNOSIS — Z12.11 COLON CANCER SCREENING: Primary | ICD-10-CM

## 2023-11-16 ENCOUNTER — HOSPITAL ENCOUNTER (OUTPATIENT)
Dept: GASTROENTEROLOGY | Facility: EXTERNAL LOCATION | Age: 68
Discharge: HOME | End: 2023-11-16
Payer: COMMERCIAL

## 2023-11-16 ENCOUNTER — TELEPHONE (OUTPATIENT)
Dept: GASTROENTEROLOGY | Facility: EXTERNAL LOCATION | Age: 68
End: 2023-11-16

## 2023-11-16 VITALS
HEIGHT: 68 IN | WEIGHT: 216 LBS | SYSTOLIC BLOOD PRESSURE: 113 MMHG | RESPIRATION RATE: 14 BRPM | TEMPERATURE: 97.2 F | BODY MASS INDEX: 32.74 KG/M2 | OXYGEN SATURATION: 95 % | DIASTOLIC BLOOD PRESSURE: 88 MMHG | HEART RATE: 88 BPM

## 2023-11-16 DIAGNOSIS — D12.6 ADENOMATOUS POLYP OF COLON, UNSPECIFIED PART OF COLON: Chronic | ICD-10-CM

## 2023-11-16 DIAGNOSIS — Z12.11 COLON CANCER SCREENING: Primary | ICD-10-CM

## 2023-11-16 PROCEDURE — 88305 TISSUE EXAM BY PATHOLOGIST: CPT

## 2023-11-16 PROCEDURE — 45380 COLONOSCOPY AND BIOPSY: CPT | Performed by: INTERNAL MEDICINE

## 2023-11-16 PROCEDURE — 88305 TISSUE EXAM BY PATHOLOGIST: CPT | Performed by: PATHOLOGY

## 2023-11-16 RX ORDER — SODIUM CHLORIDE 9 MG/ML
20 INJECTION, SOLUTION INTRAVENOUS CONTINUOUS
Status: CANCELLED | OUTPATIENT
Start: 2023-11-16

## 2023-11-16 ASSESSMENT — PAIN SCALES - GENERAL
PAINLEVEL_OUTOF10: 0 - NO PAIN

## 2023-11-16 ASSESSMENT — PAIN - FUNCTIONAL ASSESSMENT
PAIN_FUNCTIONAL_ASSESSMENT: 0-10

## 2023-11-16 NOTE — DISCHARGE INSTRUCTIONS
Patient Instructions Post Procedure      The anesthetics, sedatives or narcotics which were given to you today will be acting in your body for the next 24 hours, so you might feel a little sleepy or groggy.  This feeling should slowly wear off. Carefully read and follow the instructions.     You received sedation today:  - Do not drive or operate any machinery or power tools of any kind.   - No alcoholic beverages today, not even beer or wine.  - Do not make any important decisions or sign any legal documents.  - No over the counter medications that contain alcohol or that may cause drowsiness.    While it is common to experience mild to moderate abdominal distention, gas, or belching after your procedure, if any of these symptoms occur following discharge from the GI Lab or within one week of having your procedure, call the [facility] to be advised whether a visit to your nearest Urgent Care or Emergency Department is indicated.  Take this paper with you if you go.   - If you develop an allergic reaction to the medications that were given during your procedure such as difficulty breathing, rash, hives, severe nausea, vomiting or lightheadedness.  - If you experience chest pain, shortness of breath, severe abdominal pain, fevers and chills.  -If you develop signs and symptoms of bleeding such as blood in your spit, if your stools turn black, tarry, or bloody  - If you have not urinated within 8 hours following your procedure.  - If your IV site becomes painful, red, inflamed, or looks infected.    If you received a biopsy/polypectomy/sphincterotomy the following instructions apply below:  __ Do not use Aspirin containing products, non-steroidal medications or anti-coagulants for one week following your procedure. (Examples of these types of medications are: Advil, Arthrotec, Aleve, Coumadin, Ecotrin, Heparin, Ibuprofen, Indocin, Motrin, Naprosyn, Nuprin, Plavix, Vioxx, and Voltarin, or their generic  forms.  This list is not all-inclusive.  Check with your physician or pharmacist before resuming medications.)   __ Eat a soft diet today.  Avoid foods that are poorly digested for the next 24 hours.  These foods would include: nuts, beans, lettuce, red meats, and fried foods. Start with liquids and advance your diet as tolerated, gradually work up to eating solids.   __ Do not have a Barium Study or Enema for one week.    Your physician recommends the additional following instructions:    -You have a contact number available for emergencies. The signs and symptoms of potential delayed complications were discussed with you. You may return to normal activities tomorrow.  -Resume your previous diet or other if specified.  -Continue your present medications.   -We are waiting for your pathology results, if applicable.  -The findings and recommendations have been discussed with you and/or family.  - Please see Medication Reconciliation Form for new medication/medications prescribed.     If you experience any problems or have any questions following discharge from the GI Lab, please call: [phone] from 7 am- 4:30 pm.  In the event of an emergency please go to the closest Emergency Department or call

## 2023-11-16 NOTE — PRE-SEDATION DOCUMENTATION
Patient: Ronny Read  MRN: [id number]    Pre-sedation Evaluation:  Sedation necessary for: Analgesia  Requesting service: gi    History of Present Illness: screening colonoscopy     Past Medical History:   Diagnosis Date    Acute pharyngitis, unspecified 02/05/2016    Sore throat    Acute pharyngitis, unspecified 02/05/2016    Sore throat    Allergic     Anxiety     Anxiety disorder, unspecified 03/18/2015    Anxiety    Candidiasis, unspecified 08/17/2020    Budding yeast detected    Cataract     COVID-19 11/16/2020    COVID-19    Depression     Diverticulosis of intestine, part unspecified, without perforation or abscess without bleeding 06/25/2013    Diverticulosis    Elevated blood-pressure reading, without diagnosis of hypertension 06/10/2021    Elevated BP without diagnosis of hypertension    Hyperlipidemia     Hypertension     Inflammatory bowel disease     Other conditions influencing health status 03/14/2014    Cardiovascular Stress Test    Other nonspecific abnormal finding of lung field 03/07/2014    Radiologic findings of lung field, abnormal    Other specified symptoms and signs involving the digestive system and abdomen 01/31/2019    Tenesmus    Personal history of colonic polyps     History of adenomatous polyp of colon    Personal history of diseases of the blood and blood-forming organs and certain disorders involving the immune mechanism 05/12/2016    History of leukocytosis    Personal history of nicotine dependence 01/26/2015    History of nicotine dependence    Personal history of other diseases of the circulatory system 01/24/2015    History of hypertension    Personal history of other diseases of the digestive system 04/05/2019    History of diverticulitis of colon    Personal history of other drug therapy 06/16/2020    History of drug therapy    Personal history of other specified conditions 06/17/2021    History of dysuria    Prediabetes     Prediabetes    Substance abuse (CMS/[facility])         Principle problems:  Patient Active Problem List    Diagnosis Date Noted    Alcohol-induced polyneuropathy (CMS/[facility]) 10/06/2023    Need for influenza vaccination 10/06/2023    Immunization counseling 10/06/2023    Elevated fasting glucose 10/06/2023    Osteoarthritis of multiple joints 10/06/2023    Abdominal bloating 02/17/2023    Acute sinusitis 02/17/2023    Agatston coronary artery calcium score between 200 and 399 02/17/2023    Arteriosclerosis of coronary artery 02/17/2023    Alcohol abuse 02/17/2023    Alcohol dependence with inpatient treatment (CMS/[facility]) 02/17/2023    Allergic rhinitis 02/17/2023    Anemia 02/17/2023    Anosmia 02/17/2023    Arthritis 02/17/2023    BPH with obstruction/lower urinary tract symptoms 02/17/2023    Calcium oxalate crystals in urine 02/17/2023    Chronic diarrhea 02/17/2023    Diverticulosis of colon 02/17/2023    Eczema 02/17/2023    Edema 02/17/2023    Essential hypertension 02/17/2023    External hemorrhoids 02/17/2023    Dyslipidemia 02/17/2023    Irritable bowel syndrome 02/17/2023    Kyphosis, acquired 02/17/2023    Loose bowel movements 02/17/2023    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/17/2023    Microscopic hematuria 02/17/2023    Osteopenia 02/17/2023    Overweight (BMI 25.0-29.9) 02/17/2023    Personal history of colonic polyps 02/17/2023    Rash 02/17/2023    Respiratory tract infection due to COVID-19 virus 02/17/2023    Situational anxiety 02/17/2023    Tinea cruris 02/17/2023    Traumatic arthropathy 02/17/2023    Adenomatous polyp of colon 02/17/2023    Upper respiratory infection, acute 02/17/2023    Vitamin D deficiency 02/17/2023    Wears glasses 02/17/2023     Allergies:  Allergies   Allergen Reactions    Clarithromycin Swelling    Escitalopram Other and Headache     PTA/Current Medications:  (Not in a hospital admission)    Current Outpatient Medications   Medication Sig Dispense Refill    atorvastatin (Lipitor) 20 mg tablet Take 1 tablet (20 mg)  by mouth once daily. for high cholesterol 90 tablet 3    cholecalciferol (Vitamin D-3) 50 mcg (2,000 unit) capsule Take 1 capsule (50 mcg) by mouth early in the morning..      cyanocobalamin (Vitamin B-12) 1,000 mcg tablet TAKE ONE TABLET BY MOUTH DAILY 90 tablet 3    fluocinolone (Synalar) 0.025 % cream twice a day. APPLY SPARINGLY TO AFFECTED AREA(S) TWICE DAILY      fluticasone (Flonase) 50 mcg/actuation nasal spray APPLY ONE SPRAY INTO EACH NOSTRIL TWICE DAILY 48 g 0    folic acid (Folvite) 1 mg tablet TAKE ONE TABLET BY MOUTH DAILY 90 tablet 3    gabapentin (Neurontin) 300 mg capsule Take 1 capsule (300 mg) by mouth 3 times a day. 270 capsule 0    multivitamin tablet Take 1 tablet by mouth once daily.      tadalafil (Cialis) 5 mg tablet  90 tablet 3    wheat dextrin (Benefiber Clear SF, dextrin,) 3 gram/3.5 gram powder in packet Take by mouth twice a day. TAKE 2 TSP Twice daily      zoster vaccine-recombinant adjuvanted (Shingrix) 50 mcg/0.5 mL vaccine Give 1 injection, 0.5 ml IM now, and repeat in 8 weeks 0.5 mL 1     No current facility-administered medications for this encounter.     Past Surgical History:   has a past surgical history that includes Colonoscopy (12/26/2012); Cataract extraction (01/23/2015); Hemorrhoid surgery (01/23/2015); Other surgical history (01/18/2014); Other surgical history (01/18/2014); Other surgical history (01/18/2014); and [place] tooth extraction.    Recent sedation/surgery (24 hours)     Review of Systems:  Please check all that apply:         NPO guidelines met:     Physical Exam    Airway  Mallampati: I  TM distance: <3 FB  Neck ROM: full     Cardiovascular - normal exam     Dental - normal exam     Pulmonary - normal exam         Plan    ASA 2     Deep

## 2023-11-30 LAB
LABORATORY COMMENT REPORT: NORMAL
PATH REPORT.FINAL DX SPEC: NORMAL
PATH REPORT.GROSS SPEC: NORMAL
PATH REPORT.TOTAL CANCER: NORMAL

## 2023-12-03 NOTE — RESULT ENCOUNTER NOTE
Terrence    Thank you for doing the colonoscopy.  The gastroenterologist sent me the biopsy report confirming that there was a bladder polyp.  Please check with the gastro team to see when your next colonoscopy should be.    Sincerely,  Dwayne Hoffmann MD

## 2023-12-07 ENCOUNTER — TELEPHONE (OUTPATIENT)
Dept: GASTROENTEROLOGY | Facility: EXTERNAL LOCATION | Age: 68
End: 2023-12-07
Payer: COMMERCIAL

## 2024-01-05 DIAGNOSIS — F41.8 SITUATIONAL ANXIETY: Chronic | ICD-10-CM

## 2024-01-05 RX ORDER — GABAPENTIN 300 MG/1
300 CAPSULE ORAL 3 TIMES DAILY
Qty: 270 CAPSULE | Refills: 0 | Status: SHIPPED | OUTPATIENT
Start: 2024-01-05 | End: 2024-04-18 | Stop reason: SDUPTHER

## 2024-02-19 ENCOUNTER — OFFICE VISIT (OUTPATIENT)
Dept: PRIMARY CARE | Facility: CLINIC | Age: 69
End: 2024-02-19
Payer: COMMERCIAL

## 2024-02-19 VITALS
RESPIRATION RATE: 16 BRPM | SYSTOLIC BLOOD PRESSURE: 136 MMHG | HEART RATE: 67 BPM | HEIGHT: 68 IN | DIASTOLIC BLOOD PRESSURE: 86 MMHG | BODY MASS INDEX: 33.04 KG/M2 | WEIGHT: 218 LBS | TEMPERATURE: 98.8 F | OXYGEN SATURATION: 95 %

## 2024-02-19 DIAGNOSIS — M25.561 ACUTE PAIN OF RIGHT KNEE: Primary | ICD-10-CM

## 2024-02-19 DIAGNOSIS — G62.1 ALCOHOL-INDUCED POLYNEUROPATHY (MULTI): ICD-10-CM

## 2024-02-19 PROBLEM — F10.20: Status: RESOLVED | Noted: 2023-02-17 | Resolved: 2024-02-19

## 2024-02-19 PROCEDURE — 3008F BODY MASS INDEX DOCD: CPT | Performed by: INTERNAL MEDICINE

## 2024-02-19 PROCEDURE — 1160F RVW MEDS BY RX/DR IN RCRD: CPT | Performed by: INTERNAL MEDICINE

## 2024-02-19 PROCEDURE — 99213 OFFICE O/P EST LOW 20 MIN: CPT | Performed by: INTERNAL MEDICINE

## 2024-02-19 PROCEDURE — 3075F SYST BP GE 130 - 139MM HG: CPT | Performed by: INTERNAL MEDICINE

## 2024-02-19 PROCEDURE — 1126F AMNT PAIN NOTED NONE PRSNT: CPT | Performed by: INTERNAL MEDICINE

## 2024-02-19 PROCEDURE — 1123F ACP DISCUSS/DSCN MKR DOCD: CPT | Performed by: INTERNAL MEDICINE

## 2024-02-19 PROCEDURE — 1036F TOBACCO NON-USER: CPT | Performed by: INTERNAL MEDICINE

## 2024-02-19 PROCEDURE — 1159F MED LIST DOCD IN RCRD: CPT | Performed by: INTERNAL MEDICINE

## 2024-02-19 PROCEDURE — 3079F DIAST BP 80-89 MM HG: CPT | Performed by: INTERNAL MEDICINE

## 2024-02-19 RX ORDER — MELOXICAM 15 MG/1
15 TABLET ORAL DAILY PRN
Qty: 30 TABLET | Refills: 2 | Status: SHIPPED | OUTPATIENT
Start: 2024-02-19 | End: 2024-05-16 | Stop reason: WASHOUT

## 2024-02-19 ASSESSMENT — PATIENT HEALTH QUESTIONNAIRE - PHQ9
2. FEELING DOWN, DEPRESSED OR HOPELESS: NOT AT ALL
1. LITTLE INTEREST OR PLEASURE IN DOING THINGS: NOT AT ALL
SUM OF ALL RESPONSES TO PHQ9 QUESTIONS 1 AND 2: 0

## 2024-02-19 ASSESSMENT — ENCOUNTER SYMPTOMS
LOSS OF SENSATION IN FEET: 0
OCCASIONAL FEELINGS OF UNSTEADINESS: 0
DEPRESSION: 0

## 2024-02-19 NOTE — PROGRESS NOTES
"Subjective   Patient ID: Ronny Read is a 68 y.o. male who presents for Knee Pain (Right side) and Leg Pain (Right side ).    Here with right knee pain worsening.  No obvious trauma or injury but it has been far more sore         Review of Systems    Objective   /86   Pulse 67   Temp 37.1 °C (98.8 °F)   Resp 16   Ht 1.715 m (5' 7.5\")   Wt 98.9 kg (218 lb)   SpO2 95%   BMI 33.64 kg/m²     Physical Exam  Constitutional:       Comments: Well-appearing overweight male in no distress  He ambulated without a cane.  Eye exam revealed pupils equally round and reactive, with extraocular muscles intact. Normal sclera and eyelids.  Pulmonary exam revealed clear breath sounds bilaterally in all lung fields. No wheezes bronchitis or rales noted.  Cardiac exam revealed regular rate and rhythm without murmurs gallops or rubs.  Left knee exam unremarkable  Right knee without obvious effusion.  Collateral and cruciate ligaments appeared intact  Pain localized along the medial joint line         Assessment/Plan   Problem List Items Addressed This Visit             ICD-10-CM    Alcohol-induced polyneuropathy (CMS/HCC) G62.1     Other Visit Diagnoses         Codes    Acute pain of right knee    -  Primary M25.561    Relevant Medications    meloxicam (Mobic) 15 mg tablet    Other Relevant Orders    Referral to Orthopaedic Surgery             Right knee pain-acute-along the medial joint line worrisome for meniscal injury in light of some recent instability issues.  Wearing a patella stabilizing sleeve-not helpful but he thinks he got the wrong size.  [facility]-he will see Dr. Hampton 2/21.  Call the office to set this up.  He will continue his gabapentin and limit activity and walking and use ice, he will continue Tylenol arthritis strength 2 tablets 2-3 times daily and rather than ibuprofen use meloxicam daily over the next week or 2.  "

## 2024-02-21 ENCOUNTER — CLINICAL SUPPORT (OUTPATIENT)
Dept: ORTHOPEDIC SURGERY | Facility: CLINIC | Age: 69
End: 2024-02-21
Payer: COMMERCIAL

## 2024-02-21 ENCOUNTER — OFFICE VISIT (OUTPATIENT)
Dept: ORTHOPEDIC SURGERY | Facility: CLINIC | Age: 69
End: 2024-02-21
Payer: COMMERCIAL

## 2024-02-21 DIAGNOSIS — M25.561 ACUTE PAIN OF RIGHT KNEE: ICD-10-CM

## 2024-02-21 DIAGNOSIS — M17.11 PRIMARY OSTEOARTHRITIS OF RIGHT KNEE: Primary | ICD-10-CM

## 2024-02-21 PROCEDURE — 3008F BODY MASS INDEX DOCD: CPT | Performed by: ORTHOPAEDIC SURGERY

## 2024-02-21 PROCEDURE — 99203 OFFICE O/P NEW LOW 30 MIN: CPT | Performed by: ORTHOPAEDIC SURGERY

## 2024-02-21 PROCEDURE — 1159F MED LIST DOCD IN RCRD: CPT | Performed by: ORTHOPAEDIC SURGERY

## 2024-02-21 PROCEDURE — 1036F TOBACCO NON-USER: CPT | Performed by: ORTHOPAEDIC SURGERY

## 2024-02-21 PROCEDURE — 73560 X-RAY EXAM OF KNEE 1 OR 2: CPT | Mod: RIGHT SIDE | Performed by: ORTHOPAEDIC SURGERY

## 2024-02-21 PROCEDURE — 1126F AMNT PAIN NOTED NONE PRSNT: CPT | Performed by: ORTHOPAEDIC SURGERY

## 2024-02-21 PROCEDURE — 1123F ACP DISCUSS/DSCN MKR DOCD: CPT | Performed by: ORTHOPAEDIC SURGERY

## 2024-02-21 PROCEDURE — 20610 DRAIN/INJ JOINT/BURSA W/O US: CPT | Performed by: ORTHOPAEDIC SURGERY

## 2024-02-21 PROCEDURE — 1160F RVW MEDS BY RX/DR IN RCRD: CPT | Performed by: ORTHOPAEDIC SURGERY

## 2024-02-21 RX ORDER — LIDOCAINE HYDROCHLORIDE 10 MG/ML
5 INJECTION INFILTRATION; PERINEURAL
Status: COMPLETED | OUTPATIENT
Start: 2024-02-21 | End: 2024-02-21

## 2024-02-21 RX ORDER — BETAMETHASONE SODIUM PHOSPHATE AND BETAMETHASONE ACETATE 3; 3 MG/ML; MG/ML
2 INJECTION, SUSPENSION INTRA-ARTICULAR; INTRALESIONAL; INTRAMUSCULAR; SOFT TISSUE
Status: COMPLETED | OUTPATIENT
Start: 2024-02-21 | End: 2024-02-21

## 2024-02-21 RX ADMIN — LIDOCAINE HYDROCHLORIDE 5 ML: 10 INJECTION INFILTRATION; PERINEURAL at 14:02

## 2024-02-21 RX ADMIN — BETAMETHASONE SODIUM PHOSPHATE AND BETAMETHASONE ACETATE 2 ML: 3; 3 INJECTION, SUSPENSION INTRA-ARTICULAR; INTRALESIONAL; INTRAMUSCULAR; SOFT TISSUE at 14:02

## 2024-02-21 NOTE — PROGRESS NOTES
History of present: Patient with about a 3-week history of pretty severe right knee pain he was away out of town in [place] took Motrin then Tylenol had some relief but still considerable pain medial joint line with no real traumatic event Due to overloading the left knee he started to get some sore patella femoral irritation on the left knee but nothing like the right knee        Past medical history:    The patient's past medical history, family history, social history and review of systems were documented on the patient's medical intake form.  The medical intake form was reviewed and scanned into the electronic medical record for future use.  History is otherwise negative except as stated in the history of present illness.    Physical exam:    General: Alert and oriented to person place and time.  No acute distress and breathing comfortably, pleasant and cooperative with examination.    Head and neck exam: Head is normocephalic atraumatic.    Neck: Supple no visible swelling or deformity.    Cardiovascular: Good perfusion to affected extremities without signs or symptoms of chest pain.    Lungs no audible wheezing or labored breathing on examination.    Abdominal exam: Nondistended nontender    Extremities: The affected right knee was examined and inspected and was tender to touch along the medial and lateral aspect with catching, locking or mechanical symptoms.    The skin was intact without breakdown or open wound.  Old incisions of present were healed.    There was a mild Lizbeth exam seen with some evidence of instability and weakness in the collateral ligaments with varus valgus stressing and laxity in the anterior or posterior planes.    There was a negative Lachman's test pivot shift test and posterior drawer sign with no foot drop, numbness or tingling.    Sensation, reflexes and pulses in the foot and ankle are preserved.  There was an effusion.  Range of motion showed good straight leg raise with  flexion to 150 degrees  and extension to 0 degrees degrees.  The patient had the ability to bear weight but with discomfort.  The patient's gait was antalgic secondary to discomfort      Before aspiration injection the benefits of a cortisone injection including infection, local skin irritation, skin atrophy, calcification, continued pain and discomfort, elevated blood sugar, burning, failure to relieve pain, possible late infection were discussed with the patient.    Postprocedure discomfort can be alleviated with additional medications, ice, elevation, rest over the first 24 hours as recommended.    Patient verbalized understanding and wanted to proceed with the planned procedure.    After informed consent was provided and allergies verified, the patient was positioned appropriately on the bed.  The right knee to be aspirated and injected was prepped and draped in a sterile fashion.  The skin was anesthetized with ethyl chloride spray.  A joint aspiration was to be performed an 18-gauge needle was used otherwise a 22-gauge needle was used to inject the appropriate joint.    Joint injection was performed with a mixture of 5 cc 1% lidocaine plain and 2 cc Celestone Soluspan 6 mg per mL.  The needle was removed and the puncture site closed and sealed with a Band-Aid.  The patient tolerated the procedure well.            Diagnostic studies: X-rays show moderate joint space narrowing flattening of the weightbearing surface medial condyle neutral alignment but early patellofemoral and medial joint space arthrosis right knee moderate in nature      Impression: Right knee moderate osteoarthritis      Plan: Knee injection low impact gentle range of motion moist heat or ice can be beneficial continue anti-inflammatories if tolerable    I will see him back 3 to 4 weeks if not significant improved an  brace and gel shot may be ordered also we may consider MRI if he is developing more mechanical symptoms    If he does  very well with the right knee he may return in 3 to 4 weeks for left knee x-rays and injection in the left knee as it has been recently irritated from overload and use but otherwise he is doing okay and he wants to hold off on left knee treatment  L Inj/Asp: R knee on 2/21/2024 2:02 PM  Indications: pain and diagnostic evaluation  Details: 22 G needle, medial approach  Medications: 5 mL lidocaine 10 mg/mL (1 %); 2 mL betamethasone acet,sod phos 6 mg/mL  Outcome: tolerated well, no immediate complications  Procedure, treatment alternatives, risks and benefits explained, specific risks discussed. Consent was given by the patient. Immediately prior to procedure a time out was called to verify the correct patient, procedure, equipment, support staff and site/side marked as required. Patient was prepped and draped in the usual sterile fashion.

## 2024-03-13 ENCOUNTER — OFFICE VISIT (OUTPATIENT)
Dept: ORTHOPEDIC SURGERY | Facility: CLINIC | Age: 69
End: 2024-03-13
Payer: COMMERCIAL

## 2024-03-13 DIAGNOSIS — M17.11 PRIMARY OSTEOARTHRITIS OF RIGHT KNEE: Primary | ICD-10-CM

## 2024-03-13 PROCEDURE — 1160F RVW MEDS BY RX/DR IN RCRD: CPT | Performed by: ORTHOPAEDIC SURGERY

## 2024-03-13 PROCEDURE — 99213 OFFICE O/P EST LOW 20 MIN: CPT | Performed by: ORTHOPAEDIC SURGERY

## 2024-03-13 PROCEDURE — 3008F BODY MASS INDEX DOCD: CPT | Performed by: ORTHOPAEDIC SURGERY

## 2024-03-13 PROCEDURE — 1123F ACP DISCUSS/DSCN MKR DOCD: CPT | Performed by: ORTHOPAEDIC SURGERY

## 2024-03-13 PROCEDURE — 1159F MED LIST DOCD IN RCRD: CPT | Performed by: ORTHOPAEDIC SURGERY

## 2024-03-13 PROCEDURE — 1036F TOBACCO NON-USER: CPT | Performed by: ORTHOPAEDIC SURGERY

## 2024-03-13 NOTE — PROGRESS NOTES
History of present illness: Patient here after right knee injection he had a lot of pain swelling discomfort it took about 6070% of the pain away it took a few days to kick in but he is feeling better he still some soreness and swelling medially over the injection site and behind the knee    Physical exam:    General: No acute distress or breathing difficulty or discomfort, pleasant and cooperative with the examination.    Extremities: The affected right knee was examined and inspected and was tender to touch along the medial and lateral aspect with catching, locking or mechanical symptoms.    The skin was intact without breakdown or open wound.  Old incisions of present were healed.    There was a mild Lizbeth exam seen with some evidence of instability and weakness in the collateral ligaments with varus valgus stressing and laxity in the anterior or posterior planes.    There was a negative Lachman's test pivot shift test and posterior drawer sign with no foot drop, numbness or tingling.    Sensation, reflexes and pulses in the foot and ankle are preserved.  There was an effusion.  Range of motion showed good straight leg raise with flexion to 150 degrees and extension to 0 degrees.  The patient had the ability to bear weight but with discomfort.  The patient's gait was antalgic secondary to discomfort    Diagnostic studies: No new x-ray    Impression: X-rays were done previously at the last visit show moderate advancing arthritic change right knee    Plan: Trying hard to avoid any surgery especially this summer now for gel injections right knee

## 2024-04-16 ENCOUNTER — APPOINTMENT (OUTPATIENT)
Dept: PRIMARY CARE | Facility: CLINIC | Age: 69
End: 2024-04-16
Payer: COMMERCIAL

## 2024-04-18 DIAGNOSIS — F41.8 SITUATIONAL ANXIETY: Chronic | ICD-10-CM

## 2024-04-19 RX ORDER — GABAPENTIN 300 MG/1
300 CAPSULE ORAL 3 TIMES DAILY
Qty: 270 CAPSULE | Refills: 0 | Status: SHIPPED | OUTPATIENT
Start: 2024-04-19

## 2024-05-10 ENCOUNTER — APPOINTMENT (OUTPATIENT)
Dept: PRIMARY CARE | Facility: CLINIC | Age: 69
End: 2024-05-10
Payer: COMMERCIAL

## 2024-05-16 ENCOUNTER — OFFICE VISIT (OUTPATIENT)
Dept: PRIMARY CARE | Facility: CLINIC | Age: 69
End: 2024-05-16
Payer: COMMERCIAL

## 2024-05-16 VITALS
OXYGEN SATURATION: 98 % | HEIGHT: 68 IN | HEART RATE: 62 BPM | SYSTOLIC BLOOD PRESSURE: 138 MMHG | BODY MASS INDEX: 33.04 KG/M2 | DIASTOLIC BLOOD PRESSURE: 76 MMHG | TEMPERATURE: 98.3 F | WEIGHT: 218 LBS

## 2024-05-16 DIAGNOSIS — R73.01 ELEVATED FASTING GLUCOSE: ICD-10-CM

## 2024-05-16 DIAGNOSIS — J30.1 SEASONAL ALLERGIC RHINITIS DUE TO POLLEN: ICD-10-CM

## 2024-05-16 DIAGNOSIS — N40.1 BPH WITH OBSTRUCTION/LOWER URINARY TRACT SYMPTOMS: ICD-10-CM

## 2024-05-16 DIAGNOSIS — L30.9 ECZEMA, UNSPECIFIED TYPE: ICD-10-CM

## 2024-05-16 DIAGNOSIS — N13.8 BPH WITH OBSTRUCTION/LOWER URINARY TRACT SYMPTOMS: ICD-10-CM

## 2024-05-16 DIAGNOSIS — D64.9 ANEMIA, UNSPECIFIED TYPE: ICD-10-CM

## 2024-05-16 DIAGNOSIS — E78.5 DYSLIPIDEMIA: ICD-10-CM

## 2024-05-16 DIAGNOSIS — E55.9 VITAMIN D DEFICIENCY: Primary | ICD-10-CM

## 2024-05-16 PROCEDURE — 1159F MED LIST DOCD IN RCRD: CPT | Performed by: INTERNAL MEDICINE

## 2024-05-16 PROCEDURE — 1160F RVW MEDS BY RX/DR IN RCRD: CPT | Performed by: INTERNAL MEDICINE

## 2024-05-16 PROCEDURE — 3078F DIAST BP <80 MM HG: CPT | Performed by: INTERNAL MEDICINE

## 2024-05-16 PROCEDURE — 1123F ACP DISCUSS/DSCN MKR DOCD: CPT | Performed by: INTERNAL MEDICINE

## 2024-05-16 PROCEDURE — 3075F SYST BP GE 130 - 139MM HG: CPT | Performed by: INTERNAL MEDICINE

## 2024-05-16 PROCEDURE — 99214 OFFICE O/P EST MOD 30 MIN: CPT | Performed by: INTERNAL MEDICINE

## 2024-05-16 RX ORDER — FLUTICASONE PROPIONATE 50 MCG
1 SPRAY, SUSPENSION (ML) NASAL 2 TIMES DAILY
Qty: 48 G | Refills: 3 | Status: SHIPPED | OUTPATIENT
Start: 2024-05-16

## 2024-05-16 RX ORDER — FLUOCINOLONE ACETONIDE 0.25 MG/G
CREAM TOPICAL 2 TIMES DAILY
Qty: 30 G | Refills: 3 | Status: SHIPPED | OUTPATIENT
Start: 2024-05-16 | End: 2024-05-22

## 2024-05-16 ASSESSMENT — PATIENT HEALTH QUESTIONNAIRE - PHQ9
SUM OF ALL RESPONSES TO PHQ9 QUESTIONS 1 AND 2: 0
1. LITTLE INTEREST OR PLEASURE IN DOING THINGS: NOT AT ALL
2. FEELING DOWN, DEPRESSED OR HOPELESS: NOT AT ALL

## 2024-05-16 ASSESSMENT — ENCOUNTER SYMPTOMS
LOSS OF SENSATION IN FEET: 0
OCCASIONAL FEELINGS OF UNSTEADINESS: 0
DEPRESSION: 0

## 2024-05-16 NOTE — PROGRESS NOTES
"Subjective   Patient ID: Ronny Read is a 68 y.o. male who presents for Follow-up.    Here for follow-up visit.  His right knee is better.  He is a bit frustrated that he never got the gel shot call from the orthopedist  He enjoys walking his dog  He saw dermatology recently-his scalp is been a bit irritated         Review of Systems    Objective   /76   Pulse 62   Temp 36.8 °C (98.3 °F)   Ht 1.715 m (5' 7.5\")   Wt 98.9 kg (218 lb)   SpO2 98%   BMI 33.64 kg/m²     Physical Exam  Vitals reviewed.   Constitutional:       Appearance: Normal appearance.   HENT:      Head: Normocephalic and atraumatic.   Eyes:      General: No scleral icterus.        Right eye: No discharge.         Left eye: No discharge.      Extraocular Movements: Extraocular movements intact.      Conjunctiva/sclera: Conjunctivae normal.      Pupils: Pupils are equal, round, and reactive to light.   Cardiovascular:      Rate and Rhythm: Normal rate and regular rhythm.      Pulses: Normal pulses.      Heart sounds: Normal heart sounds. No murmur heard.  Pulmonary:      Effort: Pulmonary effort is normal.      Breath sounds: Normal breath sounds. No wheezing or rhonchi.   Musculoskeletal:         General: No deformity or signs of injury. Normal range of motion.      Cervical back: Normal range of motion and neck supple. No rigidity or tenderness.      Comments: Right knee with degenerative changes no effusion or instability   Lymphadenopathy:      Cervical: No cervical adenopathy.   Skin:     General: Skin is warm and dry.      Findings: No rash.      Comments: He had some irritated follicles occipital area within the scalp line   Neurological:      General: No focal deficit present.      Mental Status: He is alert and oriented to person, place, and time. Mental status is at baseline.      Cranial Nerves: No cranial nerve deficit.      Sensory: No sensory deficit.      Gait: Gait normal.   Psychiatric:         Mood and Affect: Mood " normal.         Behavior: Behavior normal.         Thought Content: Thought content normal.         Judgment: Judgment normal.         Assessment/Plan   Problem List Items Addressed This Visit             ICD-10-CM    Allergic rhinitis J30.9    Relevant Medications    fluticasone (Flonase) 50 mcg/actuation nasal spray    Anemia D64.9    Relevant Orders    CBC    Vitamin B12    BPH with obstruction/lower urinary tract symptoms N40.1, N13.8    Relevant Orders    Prostate Specific Antigen    Eczema L30.9    Relevant Medications    fluocinolone (Synalar) 0.025 % cream    Dyslipidemia E78.5    Relevant Orders    Lipid Panel    TSH with reflex to Free T4 if abnormal    Comprehensive Metabolic Panel    Vitamin D deficiency - Primary E55.9    Relevant Orders    Vitamin D 25-Hydroxy,Total (for eval of Vitamin D levels)    Elevated fasting glucose R73.01    Relevant Orders    Hemoglobin A1C    Comprehensive Metabolic Panel     Portions of this encounter note have been copied from my previous note dated  10/6/23 , which have been updated where appropriate and all reflect my current medical decision making from today.        Living situation - he and his wife and dog live in a 2 story house - bedroom and home office on the 2nd floor, laundry and dog food in the basement    Elevated blood pressure/elevated weight/dyslipidemia-he has in the past had lightheadedness with a blood pressure medicine-and has refused any additional blood pressure medicine since.    He will continue statin and efforts at weight loss.          Goal LDL < 100;                Sep '23 - LDL 77 unfortunately his weight stable at BMI 33 from a year ago.  Fortunately blood pressure came down a bit on recheck but it was pretty high initially.  Discussed elevated blood pressure-at this point he still refuses to consider medications but I told him eventually he will need to reconsider medications as his blood pressure will increase with aging                     5/24-annual labs ordered.  BMI 33.6.  He will continue diet and walking efforts        Elevated glucose - he had a soda 8 hrs before. Will follow with fasting labs before next visit.    Exercise routine-not doing much at this time.  Encouraged working to get into her dog walking routine now that he has a new puppy    History of alcoholism-he remains abstinent    Anxiety-gabapentin has been very helpful-he will continue    Right knee arthritis-moderate on x-ray-initial steroid injection not helpful.  Still occasionally sore.  He will follow-up with his orthopedist to consider gel injections    Osteoarthritis-hand stiffness-consistent with osteoarthritis with Heberden's nodes and several DIP joints.  Encouraged heat and heating pad in the morning    Lumbar arthritis-his gait suggest this with his forward flexion stance.  Fortunately not problematic.    Peripheral neuropathy-some burning symptoms at night in his feet-discussed the likelihood of alcoholic related neuropathy.  Fortunately he has discontinued alcohol.  He will continue gabapentin    Colon polyp - updated Aug '17; next in 5 yrs - Aug '22.          ordered    BPH-annual PSA continues for prostate cancer screening. Nocturia not a present concern.  Daily Cialis helpful with his symptoms. PSA ok Sep '23    Allergic rhinitis-reviewed importance of using allergy medications as above consistently during their allergy season(s), and call if allergy symptoms are not well controlled on this regimen.    Seborrheic Dermatitis - [facility] - ketoconazole 2% shampoo provided still somewhat irritated.  He will [place] back accordingly    Seborrheic keratoses - s/p LN 4/24.  He will follow-up with any concerning lesions    Ear eczema - prn fluocinolone cream helps.           4/24-refilled    Glasses - Hx bilat cataracts. new reading glasses summer '23    Dental care-encouraged semiannual dental visits.    Shingrix-encouraged-he will do this at his pharmacy  at some point.  Order provided          10/23 -Not yet done. Encouraged him to do    Prevnar 20-updated Apr '23    Flu shot each fall; updated 10/6/23    Covid booster - encouraged each fall      Follow-up 6 months sooner as needed    Charting was completed using voice recognition technology and may include unintended errors.

## 2024-05-17 PROBLEM — J06.9 UPPER RESPIRATORY INFECTION, ACUTE: Status: RESOLVED | Noted: 2023-02-17 | Resolved: 2024-05-17

## 2024-05-22 DIAGNOSIS — L30.9 ECZEMA, UNSPECIFIED TYPE: ICD-10-CM

## 2024-05-22 RX ORDER — FLUOCINOLONE ACETONIDE 0.25 MG/G
CREAM TOPICAL
Qty: 30 G | Refills: 3 | Status: SHIPPED | OUTPATIENT
Start: 2024-05-22 | End: 2024-05-24

## 2024-05-24 DIAGNOSIS — L30.9 ECZEMA, UNSPECIFIED TYPE: ICD-10-CM

## 2024-05-24 RX ORDER — FLUOCINOLONE ACETONIDE 0.25 MG/G
OINTMENT TOPICAL
Qty: 30 G | Refills: 3 | Status: SHIPPED | OUTPATIENT
Start: 2024-05-24

## 2024-07-11 DIAGNOSIS — N40.1 BPH WITH OBSTRUCTION/LOWER URINARY TRACT SYMPTOMS: ICD-10-CM

## 2024-07-11 DIAGNOSIS — F41.8 SITUATIONAL ANXIETY: Chronic | ICD-10-CM

## 2024-07-11 DIAGNOSIS — N13.8 BPH WITH OBSTRUCTION/LOWER URINARY TRACT SYMPTOMS: ICD-10-CM

## 2024-07-11 DIAGNOSIS — N52.1 ERECTILE DISORDER DUE TO MEDICAL CONDITION IN MALE: ICD-10-CM

## 2024-07-12 RX ORDER — TADALAFIL 5 MG/1
5 TABLET ORAL DAILY
Qty: 90 TABLET | Refills: 3 | Status: SHIPPED | OUTPATIENT
Start: 2024-07-12

## 2024-07-12 RX ORDER — GABAPENTIN 300 MG/1
300 CAPSULE ORAL 3 TIMES DAILY
Qty: 270 CAPSULE | Refills: 0 | Status: SHIPPED | OUTPATIENT
Start: 2024-07-12

## 2024-09-30 DIAGNOSIS — E78.5 DYSLIPIDEMIA, GOAL LDL BELOW 70: ICD-10-CM

## 2024-10-01 RX ORDER — ATORVASTATIN CALCIUM 20 MG/1
20 TABLET, FILM COATED ORAL DAILY
Qty: 90 TABLET | Refills: 3 | Status: SHIPPED | OUTPATIENT
Start: 2024-10-01

## 2024-10-08 DIAGNOSIS — F41.8 SITUATIONAL ANXIETY: Chronic | ICD-10-CM

## 2024-10-08 RX ORDER — GABAPENTIN 300 MG/1
300 CAPSULE ORAL 3 TIMES DAILY
Qty: 270 CAPSULE | Refills: 0 | Status: SHIPPED | OUTPATIENT
Start: 2024-10-08

## 2024-11-08 ENCOUNTER — LAB (OUTPATIENT)
Dept: LAB | Facility: LAB | Age: 69
End: 2024-11-08
Payer: COMMERCIAL

## 2024-11-08 DIAGNOSIS — N13.8 BPH WITH OBSTRUCTION/LOWER URINARY TRACT SYMPTOMS: ICD-10-CM

## 2024-11-08 DIAGNOSIS — E78.5 DYSLIPIDEMIA: ICD-10-CM

## 2024-11-08 DIAGNOSIS — E55.9 VITAMIN D DEFICIENCY: ICD-10-CM

## 2024-11-08 DIAGNOSIS — D64.9 ANEMIA, UNSPECIFIED TYPE: ICD-10-CM

## 2024-11-08 DIAGNOSIS — R73.01 ELEVATED FASTING GLUCOSE: ICD-10-CM

## 2024-11-08 DIAGNOSIS — N40.1 BPH WITH OBSTRUCTION/LOWER URINARY TRACT SYMPTOMS: ICD-10-CM

## 2024-11-08 LAB
25(OH)D3 SERPL-MCNC: 27 NG/ML (ref 30–100)
ALBUMIN SERPL BCP-MCNC: 4.3 G/DL (ref 3.4–5)
ALP SERPL-CCNC: 93 U/L (ref 33–136)
ALT SERPL W P-5'-P-CCNC: 20 U/L (ref 10–52)
ANION GAP SERPL CALC-SCNC: 14 MMOL/L (ref 10–20)
AST SERPL W P-5'-P-CCNC: 15 U/L (ref 9–39)
BILIRUB SERPL-MCNC: 0.7 MG/DL (ref 0–1.2)
BUN SERPL-MCNC: 13 MG/DL (ref 6–23)
CALCIUM SERPL-MCNC: 9.2 MG/DL (ref 8.6–10.6)
CHLORIDE SERPL-SCNC: 104 MMOL/L (ref 98–107)
CHOLEST SERPL-MCNC: 143 MG/DL (ref 0–199)
CHOLESTEROL/HDL RATIO: 3.3
CO2 SERPL-SCNC: 27 MMOL/L (ref 21–32)
CREAT SERPL-MCNC: 1.18 MG/DL (ref 0.5–1.3)
EGFRCR SERPLBLD CKD-EPI 2021: 67 ML/MIN/1.73M*2
ERYTHROCYTE [DISTWIDTH] IN BLOOD BY AUTOMATED COUNT: 12.3 % (ref 11.5–14.5)
EST. AVERAGE GLUCOSE BLD GHB EST-MCNC: 157 MG/DL
GLUCOSE SERPL-MCNC: 145 MG/DL (ref 74–99)
HBA1C MFR BLD: 7.1 %
HCT VFR BLD AUTO: 46.6 % (ref 41–52)
HDLC SERPL-MCNC: 43.9 MG/DL
HGB BLD-MCNC: 15.6 G/DL (ref 13.5–17.5)
LDLC SERPL CALC-MCNC: 53 MG/DL
MCH RBC QN AUTO: 31.8 PG (ref 26–34)
MCHC RBC AUTO-ENTMCNC: 33.5 G/DL (ref 32–36)
MCV RBC AUTO: 95 FL (ref 80–100)
NON HDL CHOLESTEROL: 99 MG/DL (ref 0–149)
NRBC BLD-RTO: 0 /100 WBCS (ref 0–0)
PLATELET # BLD AUTO: 251 X10*3/UL (ref 150–450)
POTASSIUM SERPL-SCNC: 3.8 MMOL/L (ref 3.5–5.3)
PROT SERPL-MCNC: 6.9 G/DL (ref 6.4–8.2)
PSA SERPL-MCNC: 0.93 NG/ML
RBC # BLD AUTO: 4.91 X10*6/UL (ref 4.5–5.9)
SODIUM SERPL-SCNC: 141 MMOL/L (ref 136–145)
TRIGL SERPL-MCNC: 230 MG/DL (ref 0–149)
TSH SERPL-ACNC: 1.31 MIU/L (ref 0.44–3.98)
VIT B12 SERPL-MCNC: 1081 PG/ML (ref 211–911)
VLDL: 46 MG/DL (ref 0–40)
WBC # BLD AUTO: 7.8 X10*3/UL (ref 4.4–11.3)

## 2024-11-08 PROCEDURE — 85027 COMPLETE CBC AUTOMATED: CPT

## 2024-11-08 PROCEDURE — 80061 LIPID PANEL: CPT

## 2024-11-08 PROCEDURE — 82607 VITAMIN B-12: CPT

## 2024-11-08 PROCEDURE — 82306 VITAMIN D 25 HYDROXY: CPT

## 2024-11-08 PROCEDURE — 84153 ASSAY OF PSA TOTAL: CPT

## 2024-11-08 PROCEDURE — 80053 COMPREHEN METABOLIC PANEL: CPT

## 2024-11-08 PROCEDURE — 84443 ASSAY THYROID STIM HORMONE: CPT

## 2024-11-08 PROCEDURE — 36415 COLL VENOUS BLD VENIPUNCTURE: CPT

## 2024-11-08 PROCEDURE — 83036 HEMOGLOBIN GLYCOSYLATED A1C: CPT

## 2024-11-09 NOTE — RESULT ENCOUNTER NOTE
Bill-thanks for doing the labs.  Most of them look stable and otherwise unremarkable.    The main problem is that the labs suggest diabetes has become an issue that we will need to address.  Will review this further at your upcoming appointment.    Thanks again for doing the labs.  Looking forward to seeing you soon as scheduled.    Sincerely,  Dwayne Hoffmann MD

## 2024-11-14 ENCOUNTER — APPOINTMENT (OUTPATIENT)
Dept: PRIMARY CARE | Facility: CLINIC | Age: 69
End: 2024-11-14
Payer: COMMERCIAL

## 2024-11-14 VITALS
BODY MASS INDEX: 32.13 KG/M2 | HEIGHT: 68 IN | DIASTOLIC BLOOD PRESSURE: 78 MMHG | WEIGHT: 212 LBS | HEART RATE: 58 BPM | SYSTOLIC BLOOD PRESSURE: 130 MMHG | TEMPERATURE: 97.9 F | OXYGEN SATURATION: 97 %

## 2024-11-14 DIAGNOSIS — N40.1 BPH WITH OBSTRUCTION/LOWER URINARY TRACT SYMPTOMS: ICD-10-CM

## 2024-11-14 DIAGNOSIS — R01.1 HEART MURMUR, SYSTOLIC: ICD-10-CM

## 2024-11-14 DIAGNOSIS — R60.9 EDEMA, UNSPECIFIED TYPE: ICD-10-CM

## 2024-11-14 DIAGNOSIS — F10.20: ICD-10-CM

## 2024-11-14 DIAGNOSIS — E66.09 CLASS 1 OBESITY DUE TO EXCESS CALORIES WITH SERIOUS COMORBIDITY AND BODY MASS INDEX (BMI) OF 31.0 TO 31.9 IN ADULT: ICD-10-CM

## 2024-11-14 DIAGNOSIS — R21 RASH OF NECK: ICD-10-CM

## 2024-11-14 DIAGNOSIS — N13.8 BPH WITH OBSTRUCTION/LOWER URINARY TRACT SYMPTOMS: ICD-10-CM

## 2024-11-14 DIAGNOSIS — E11.9 TYPE 2 DIABETES MELLITUS WITHOUT COMPLICATION, WITHOUT LONG-TERM CURRENT USE OF INSULIN (MULTI): ICD-10-CM

## 2024-11-14 DIAGNOSIS — I10 ESSENTIAL HYPERTENSION: ICD-10-CM

## 2024-11-14 DIAGNOSIS — Z00.00 ANNUAL PHYSICAL EXAM: Primary | ICD-10-CM

## 2024-11-14 DIAGNOSIS — E66.811 CLASS 1 OBESITY DUE TO EXCESS CALORIES WITH SERIOUS COMORBIDITY AND BODY MASS INDEX (BMI) OF 31.0 TO 31.9 IN ADULT: ICD-10-CM

## 2024-11-14 DIAGNOSIS — E55.9 VITAMIN D DEFICIENCY: ICD-10-CM

## 2024-11-14 PROCEDURE — 3078F DIAST BP <80 MM HG: CPT | Performed by: INTERNAL MEDICINE

## 2024-11-14 PROCEDURE — 3048F LDL-C <100 MG/DL: CPT | Performed by: INTERNAL MEDICINE

## 2024-11-14 PROCEDURE — 3051F HG A1C>EQUAL 7.0%<8.0%: CPT | Performed by: INTERNAL MEDICINE

## 2024-11-14 PROCEDURE — 1123F ACP DISCUSS/DSCN MKR DOCD: CPT | Performed by: INTERNAL MEDICINE

## 2024-11-14 PROCEDURE — 1159F MED LIST DOCD IN RCRD: CPT | Performed by: INTERNAL MEDICINE

## 2024-11-14 PROCEDURE — 99397 PER PM REEVAL EST PAT 65+ YR: CPT | Performed by: INTERNAL MEDICINE

## 2024-11-14 PROCEDURE — 3008F BODY MASS INDEX DOCD: CPT | Performed by: INTERNAL MEDICINE

## 2024-11-14 PROCEDURE — 1036F TOBACCO NON-USER: CPT | Performed by: INTERNAL MEDICINE

## 2024-11-14 PROCEDURE — 1160F RVW MEDS BY RX/DR IN RCRD: CPT | Performed by: INTERNAL MEDICINE

## 2024-11-14 PROCEDURE — 3075F SYST BP GE 130 - 139MM HG: CPT | Performed by: INTERNAL MEDICINE

## 2024-11-14 RX ORDER — LANOLIN ALCOHOL/MO/W.PET/CERES
1000 CREAM (GRAM) TOPICAL
Status: SHIPPED | COMMUNITY
Start: 2024-11-14

## 2024-11-14 RX ORDER — METFORMIN HYDROCHLORIDE 500 MG/1
500 TABLET ORAL 2 TIMES DAILY
Qty: 180 TABLET | Refills: 3 | Status: SHIPPED | OUTPATIENT
Start: 2024-11-14 | End: 2025-12-19

## 2024-11-14 ASSESSMENT — PATIENT HEALTH QUESTIONNAIRE - PHQ9
1. LITTLE INTEREST OR PLEASURE IN DOING THINGS: NOT AT ALL
2. FEELING DOWN, DEPRESSED OR HOPELESS: NOT AT ALL
SUM OF ALL RESPONSES TO PHQ9 QUESTIONS 1 AND 2: 0

## 2024-11-14 NOTE — PROGRESS NOTES
"Subjective   Patient ID: Ronny Read is a 69 y.o. male who presents for Annual Exam.    Here for wellness visit  Feels well without issues with anxiety of late.  He has been trying to eat better-weight is down  Recently did labs  Right knee is a bit better  Difficulty with parents-his father is 91 recently in the hospital his mother is 90, will not leave the house, will go to doctors and refuses medications.  She actually is doing a bit better.  2 brothers live with them.  1 is disabled but the other brother does most of the work.  He tries to help out as he can    He has had a rash at the base of his neck posteriorly-rather pruritic-topical creams not helpful         Review of Systems    Objective   /78 (BP Location: Right arm, Patient Position: Sitting, BP Cuff Size: Large adult)   Pulse 58   Temp 36.6 °C (97.9 °F) (Temporal)   Ht 1.736 m (5' 8.35\")   Wt 96.2 kg (212 lb)   SpO2 97%   BMI 31.91 kg/m²     Physical Exam  Constitutional:       Appearance: Normal appearance.   HENT:      Head: Normocephalic and atraumatic.      Right Ear: Tympanic membrane normal.      Left Ear: Tympanic membrane normal.      Nose: Nose normal.   Eyes:      General: No scleral icterus.     Extraocular Movements: Extraocular movements intact.      Conjunctiva/sclera: Conjunctivae normal.      Pupils: Pupils are equal, round, and reactive to light.   Cardiovascular:      Rate and Rhythm: Normal rate and regular rhythm.      Pulses: Normal pulses.      Heart sounds: Murmur heard.   Pulmonary:      Effort: Pulmonary effort is normal. No respiratory distress.      Breath sounds: Normal breath sounds. No stridor. No wheezing.   Abdominal:      General: Abdomen is flat. Bowel sounds are normal. There is no distension.      Palpations: Abdomen is soft. There is no mass.      Tenderness: There is no abdominal tenderness. There is no guarding.   Musculoskeletal:         General: No swelling, tenderness or deformity. Normal range " of motion.      Cervical back: Normal range of motion and neck supple. No tenderness.   Lymphadenopathy:      Cervical: No cervical adenopathy.   Skin:     General: Skin is warm and dry.      Findings: No lesion or rash.      Comments: Occipital neck area-appears excoriated but no obvious lesions   Neurological:      General: No focal deficit present.      Mental Status: He is alert and oriented to person, place, and time.      Cranial Nerves: No cranial nerve deficit.      Motor: No weakness.   Psychiatric:         Mood and Affect: Mood normal.         Behavior: Behavior normal.         Thought Content: Thought content normal.         Judgment: Judgment normal.         Assessment/Plan   Problem List Items Addressed This Visit             ICD-10-CM    BPH with obstruction/lower urinary tract symptoms N40.1, N13.8    Edema R60.9    Essential hypertension I10    Rash of neck R21    Relevant Orders    Referral to Dermatology    Vitamin D deficiency E55.9    Heart murmur, systolic R01.1    Type 2 diabetes mellitus without complication, without long-term current use of insulin (Multi) E11.9    Relevant Medications    metFORMIN (Glucophage) 500 mg tablet    Other Relevant Orders    Hemoglobin A1C    Class 1 obesity due to excess calories with serious comorbidity and body mass index (BMI) of 31.0 to 31.9 in adult E66.811, E66.09, Z68.31    Annual physical exam - Primary Z00.00     Other Visit Diagnoses         Codes    Alcohol dependence with inpatient treatment (Multi)     F10.20    Relevant Medications    cyanocobalamin (Vitamin B-12) 1,000 mcg tablet               Portions of this encounter note have been copied from my previous note dated  5/16/24 , which have been updated where appropriate and all reflect my current medical decision making from today.     Labs from 11/8/2024 reviewed    EKG suggested/declined       Living situation - he and his wife and dog live in a 2 story house - bedroom and home office on the 2nd  floor, laundry and dog food in the basement    Elevated blood pressure/elevated weight/dyslipidemia-he has in the past had lightheadedness with a blood pressure medicine-and has refused any additional blood pressure medicine since.    He will continue statin and efforts at weight loss.          Goal LDL < 100;                Sep '23 - LDL 77 unfortunately his weight stable at BMI 33 from a year ago.  Fortunately blood pressure came down a bit on recheck but it was pretty high initially.  Discussed elevated blood pressure-at this point he still refuses to consider medications but I told him eventually he will need to reconsider medications as his blood pressure will increase with aging                    5/24-annual labs ordered.  BMI 33.6.  He will continue diet and walking efforts      Developing diabetes/elevated weight-          11/24-routine have advanced into the diabetic range.  He will continue weight loss efforts.  He will begin metformin 500 twice daily.  Side effects reviewed.  Nutritional consultant suggested/declined-he states he knows what to do about carb restriction.  Will consider home glucometer assessment next time if A1c not significantly better.  Will plan to recheck A1c in 3 months and follow-up in 4 months    BMI; 31.9 -11/24   weight is down 6 pounds in 6 months  A1c: 7.1%-11/24  Foot Exam:   Eye Exam:     Statin:  Yes  Lipid Panel: (goal LDL <70) LDL 53-11/24    Urine Albumin:     Influenza Immunization: 10/23  Pneumonia Immunization: 4/23    Dyslipidemia-goal LDL under 70          11/23-LDL 53    Heart murmur-        11/24-innocent heart murmur noted on exam.  Will consider echo at some point      Exercise routine-not doing much at this time.  Encouraged working to get into her dog walking routine now that he has a new puppy    History of alcoholism-he remains abstinent    Anxiety-gabapentin has been very helpful-he will continue     vitamin D deficiency            11/24-vitamin D low again  at 27 off vitamin D.  Encouraged him to restart this 2000 units daily consistently      Right knee arthritis-moderate on x-ray-initial steroid injection not helpful.  Still occasionally sore.  He will follow-up with his orthopedist to consider gel injections.  He will see Dr. Hampton as needed          Right knee a bit better    Osteoarthritis-hand stiffness-consistent with osteoarthritis with Heberden's nodes and several DIP joints.  Encouraged heat and heating pad in the morning    Lumbar arthritis-his gait suggest this with his forward flexion stance.  Fortunately not problematic.    Peripheral neuropathy-some burning symptoms at night in his feet-discussed the likelihood of alcoholic related neuropathy.  Fortunately he has discontinued alcohol.  He will continue gabapentin    Colon polyp - updated 11/23, next in 5 yrs - 11/28              BPH-annual PSA continues for prostate cancer screening. Nocturia not a present concern.  Daily Cialis helpful with his symptoms.           PSA ok 11/24    Allergic rhinitis-reviewed importance of using allergy medications as above consistently during their allergy season(s), and call if allergy symptoms are not well controlled on this regimen.    Seborrheic Dermatitis - [facility] - ketoconazole 2% shampoo provided still somewhat irritated.  He will [place] back accordingly    Seborrheic keratoses - s/p LN 4/24.  He will follow-up with any concerning lesions    Ear eczema - prn fluocinolone cream helps.           4/24-refilled    Glasses - Hx bilat cataracts. new reading glasses summer '23    Dental care-encouraged semiannual dental visits.      Caregiving concerns-his parents are over 90-advanced age living at home-          11/24-Difficulty with parents-his father is 91 recently in the hospital his mother is 90, will not leave the house, will go to doctors and refuses medications.  She actually is doing a bit better.  2 brothers live with them.  1 is disabled  but the other brother does most of the work.  He tries to help out as he can      Shingrix-encouraged-he will do this at his pharmacy at some point.  Order provided          10/23 -Not yet done. Encouraged him to do    Prevnar 20-updated Apr '23    Flu shot each fall; updated 10/6/23    Covid booster - encouraged each fall      Follow-up 4 months, after an A1c in 3 months, sooner as needed    Charting was completed using voice recognition technology and may include unintended errors.

## 2024-11-15 PROBLEM — J01.90 ACUTE SINUSITIS: Status: RESOLVED | Noted: 2023-02-17 | Resolved: 2024-11-15

## 2024-11-15 PROBLEM — J98.8 RESPIRATORY TRACT INFECTION DUE TO COVID-19 VIRUS: Status: RESOLVED | Noted: 2023-02-17 | Resolved: 2024-11-15

## 2024-11-15 PROBLEM — R01.1 HEART MURMUR, SYSTOLIC: Status: ACTIVE | Noted: 2024-11-15

## 2024-11-15 PROBLEM — E11.9 TYPE 2 DIABETES MELLITUS WITHOUT COMPLICATION, WITHOUT LONG-TERM CURRENT USE OF INSULIN (MULTI): Status: ACTIVE | Noted: 2024-11-15

## 2024-11-15 PROBLEM — U07.1 RESPIRATORY TRACT INFECTION DUE TO COVID-19 VIRUS: Status: RESOLVED | Noted: 2023-02-17 | Resolved: 2024-11-15

## 2024-11-15 PROBLEM — E66.09 CLASS 1 OBESITY DUE TO EXCESS CALORIES WITH SERIOUS COMORBIDITY AND BODY MASS INDEX (BMI) OF 31.0 TO 31.9 IN ADULT: Status: ACTIVE | Noted: 2024-11-15

## 2024-11-15 PROBLEM — Z00.00 ANNUAL PHYSICAL EXAM: Status: ACTIVE | Noted: 2024-11-15

## 2024-11-15 PROBLEM — R14.0 ABDOMINAL BLOATING: Status: RESOLVED | Noted: 2023-02-17 | Resolved: 2024-11-15

## 2024-11-15 PROBLEM — R73.01 ELEVATED FASTING GLUCOSE: Status: RESOLVED | Noted: 2023-10-06 | Resolved: 2024-11-15

## 2024-11-15 PROBLEM — E66.811 CLASS 1 OBESITY DUE TO EXCESS CALORIES WITH SERIOUS COMORBIDITY AND BODY MASS INDEX (BMI) OF 31.0 TO 31.9 IN ADULT: Status: ACTIVE | Noted: 2024-11-15

## 2024-11-15 PROBLEM — E66.3 OVERWEIGHT (BMI 25.0-29.9): Status: RESOLVED | Noted: 2023-02-17 | Resolved: 2024-11-15

## 2024-12-01 DIAGNOSIS — E11.9 TYPE 2 DIABETES MELLITUS WITHOUT COMPLICATION, WITHOUT LONG-TERM CURRENT USE OF INSULIN (MULTI): Primary | ICD-10-CM

## 2024-12-01 PROBLEM — Z63.4 BEREAVEMENT DUE TO LIFE EVENT: Status: ACTIVE | Noted: 2024-12-01

## 2024-12-01 RX ORDER — METFORMIN HYDROCHLORIDE 500 MG/1
500 TABLET, EXTENDED RELEASE ORAL
Qty: 60 TABLET | Refills: 11 | Status: SHIPPED | OUTPATIENT
Start: 2024-12-01 | End: 2026-01-05

## 2024-12-30 DIAGNOSIS — F10.20: ICD-10-CM

## 2024-12-30 RX ORDER — LANOLIN ALCOHOL/MO/W.PET/CERES
1000 CREAM (GRAM) TOPICAL
Qty: 78 TABLET | Refills: 3 | Status: SHIPPED | OUTPATIENT
Start: 2024-12-30

## 2024-12-30 RX ORDER — FOLIC ACID 1 MG/1
1 TABLET ORAL DAILY
Qty: 90 TABLET | Refills: 3 | Status: SHIPPED | OUTPATIENT
Start: 2024-12-30

## 2025-01-02 DIAGNOSIS — F41.8 SITUATIONAL ANXIETY: Chronic | ICD-10-CM

## 2025-01-02 RX ORDER — GABAPENTIN 300 MG/1
300 CAPSULE ORAL 3 TIMES DAILY
Qty: 270 CAPSULE | Refills: 0 | Status: SHIPPED | OUTPATIENT
Start: 2025-01-02

## 2025-03-26 DIAGNOSIS — F41.8 SITUATIONAL ANXIETY: Chronic | ICD-10-CM

## 2025-03-26 DIAGNOSIS — E11.9 TYPE 2 DIABETES MELLITUS WITHOUT COMPLICATION, WITHOUT LONG-TERM CURRENT USE OF INSULIN: ICD-10-CM

## 2025-03-26 DIAGNOSIS — N52.1 ERECTILE DISORDER DUE TO MEDICAL CONDITION IN MALE: ICD-10-CM

## 2025-03-26 DIAGNOSIS — R21 RASH OF NECK: Primary | ICD-10-CM

## 2025-03-26 DIAGNOSIS — E78.5 DYSLIPIDEMIA, GOAL LDL BELOW 70: ICD-10-CM

## 2025-03-26 DIAGNOSIS — N13.8 BPH WITH OBSTRUCTION/LOWER URINARY TRACT SYMPTOMS: ICD-10-CM

## 2025-03-26 DIAGNOSIS — N40.1 BPH WITH OBSTRUCTION/LOWER URINARY TRACT SYMPTOMS: ICD-10-CM

## 2025-03-26 NOTE — TELEPHONE ENCOUNTER
Pt has an upcoming appt an wanted to know if Dr. Hoffmann needs any additional blood work besides the A1C that is in the computer and advise if he should fast.

## 2025-03-27 DIAGNOSIS — E11.9 TYPE 2 DIABETES MELLITUS WITHOUT COMPLICATION, WITHOUT LONG-TERM CURRENT USE OF INSULIN: ICD-10-CM

## 2025-03-27 DIAGNOSIS — D64.9 ANEMIA, UNSPECIFIED TYPE: ICD-10-CM

## 2025-03-27 DIAGNOSIS — I10 ESSENTIAL HYPERTENSION: ICD-10-CM

## 2025-03-27 DIAGNOSIS — E78.5 DYSLIPIDEMIA: ICD-10-CM

## 2025-03-27 DIAGNOSIS — E55.9 VITAMIN D DEFICIENCY: Primary | ICD-10-CM

## 2025-03-28 RX ORDER — KETOCONAZOLE 20 MG/ML
SHAMPOO, SUSPENSION TOPICAL
COMMUNITY
Start: 2024-11-07 | End: 2025-03-28 | Stop reason: SDUPTHER

## 2025-03-28 RX ORDER — FLUOCINONIDE TOPICAL SOLUTION USP, 0.05% 0.5 MG/ML
SOLUTION TOPICAL
COMMUNITY
Start: 2024-12-22

## 2025-03-28 NOTE — TELEPHONE ENCOUNTER
Patient has new pharmacy. I have updated to [facility] and pended for your approval.

## 2025-03-29 LAB
25(OH)D3+25(OH)D2 SERPL-MCNC: 37 NG/ML (ref 30–100)
ALBUMIN SERPL-MCNC: 4.3 G/DL (ref 3.6–5.1)
ALBUMIN/GLOB SERPL: 1.7 (CALC) (ref 1–2.5)
ALP SERPL-CCNC: 74 U/L (ref 35–144)
ALT SERPL-CCNC: 16 U/L (ref 9–46)
ANION GAP SERPL CALCULATED.4IONS-SCNC: 10 MMOL/L (CALC) (ref 7–17)
AST SERPL-CCNC: 12 U/L (ref 10–35)
BILIRUB DIRECT SERPL-MCNC: 0.2 MG/DL
BILIRUB INDIRECT SERPL-MCNC: 0.6 MG/DL (CALC) (ref 0.2–1.2)
BILIRUB SERPL-MCNC: 0.8 MG/DL (ref 0.2–1.2)
BUN SERPL-MCNC: 18 MG/DL (ref 7–25)
BUN/CREAT SERPL: NORMAL (CALC) (ref 6–22)
CALCIUM SERPL-MCNC: 9.2 MG/DL (ref 8.6–10.3)
CHLORIDE SERPL-SCNC: 103 MMOL/L (ref 98–110)
CO2 SERPL-SCNC: 27 MMOL/L (ref 20–32)
CREAT SERPL-MCNC: 1.08 MG/DL (ref 0.7–1.35)
EGFRCR SERPLBLD CKD-EPI 2021: 75 ML/MIN/1.73M2
ERYTHROCYTE [DISTWIDTH] IN BLOOD BY AUTOMATED COUNT: 12 % (ref 11–15)
EST. AVERAGE GLUCOSE BLD GHB EST-MCNC: 131 MG/DL
EST. AVERAGE GLUCOSE BLD GHB EST-SCNC: 7.3 MMOL/L
GLOBULIN SER CALC-MCNC: 2.5 G/DL (CALC) (ref 1.9–3.7)
GLUCOSE SERPL-MCNC: 87 MG/DL (ref 65–99)
HBA1C MFR BLD: 6.2 % OF TOTAL HGB
HCT VFR BLD AUTO: 47.7 % (ref 38.5–50)
HGB BLD-MCNC: 15.8 G/DL (ref 13.2–17.1)
IRON SATN MFR SERPL: 32 % (CALC) (ref 20–48)
IRON SERPL-MCNC: 107 MCG/DL (ref 50–180)
MCH RBC QN AUTO: 31.5 PG (ref 27–33)
MCHC RBC AUTO-ENTMCNC: 33.1 G/DL (ref 32–36)
MCV RBC AUTO: 95 FL (ref 80–100)
PLATELET # BLD AUTO: 253 THOUSAND/UL (ref 140–400)
PMV BLD REES-ECKER: 10.4 FL (ref 7.5–12.5)
POTASSIUM SERPL-SCNC: 4.1 MMOL/L (ref 3.5–5.3)
PROT SERPL-MCNC: 6.8 G/DL (ref 6.1–8.1)
RBC # BLD AUTO: 5.02 MILLION/UL (ref 4.2–5.8)
SODIUM SERPL-SCNC: 140 MMOL/L (ref 135–146)
TIBC SERPL-MCNC: 338 MCG/DL (CALC) (ref 250–425)
WBC # BLD AUTO: 6.7 THOUSAND/UL (ref 3.8–10.8)

## 2025-03-29 NOTE — RESULT ENCOUNTER NOTE
Results reviewed. No urgent findings.  Will Review results in detail at upcoming office appointment scheduled soon.      Dwayne Hoffmann MD

## 2025-03-31 ENCOUNTER — TELEPHONE (OUTPATIENT)
Dept: PRIMARY CARE | Facility: CLINIC | Age: 70
End: 2025-03-31
Payer: MEDICARE

## 2025-03-31 RX ORDER — KETOCONAZOLE 20 MG/ML
SHAMPOO, SUSPENSION TOPICAL 2 TIMES WEEKLY
Qty: 120 ML | Refills: 3 | Status: SHIPPED | OUTPATIENT
Start: 2025-03-31 | End: 2025-04-14

## 2025-03-31 RX ORDER — GABAPENTIN 300 MG/1
300 CAPSULE ORAL 3 TIMES DAILY
Qty: 270 CAPSULE | Refills: 0 | Status: SHIPPED | OUTPATIENT
Start: 2025-03-31

## 2025-03-31 RX ORDER — TADALAFIL 5 MG/1
5 TABLET ORAL DAILY
Qty: 90 TABLET | Refills: 3 | Status: SHIPPED | OUTPATIENT
Start: 2025-03-31 | End: 2025-04-03 | Stop reason: SDUPTHER

## 2025-03-31 RX ORDER — METFORMIN HYDROCHLORIDE 500 MG/1
500 TABLET, EXTENDED RELEASE ORAL
Qty: 180 TABLET | Refills: 3 | Status: SHIPPED | OUTPATIENT
Start: 2025-03-31 | End: 2025-04-03 | Stop reason: DRUGHIGH

## 2025-03-31 RX ORDER — ATORVASTATIN CALCIUM 20 MG/1
20 TABLET, FILM COATED ORAL DAILY
Qty: 90 TABLET | Refills: 3 | Status: SHIPPED | OUTPATIENT
Start: 2025-03-31

## 2025-04-03 ENCOUNTER — APPOINTMENT (OUTPATIENT)
Dept: PRIMARY CARE | Facility: CLINIC | Age: 70
End: 2025-04-03
Payer: COMMERCIAL

## 2025-04-03 VITALS
HEIGHT: 68 IN | BODY MASS INDEX: 27.77 KG/M2 | OXYGEN SATURATION: 98 % | SYSTOLIC BLOOD PRESSURE: 128 MMHG | DIASTOLIC BLOOD PRESSURE: 78 MMHG | HEART RATE: 49 BPM | WEIGHT: 183.2 LBS

## 2025-04-03 DIAGNOSIS — N52.1 ERECTILE DISORDER DUE TO MEDICAL CONDITION IN MALE: ICD-10-CM

## 2025-04-03 DIAGNOSIS — E11.9 TYPE 2 DIABETES MELLITUS WITHOUT COMPLICATION, WITHOUT LONG-TERM CURRENT USE OF INSULIN: ICD-10-CM

## 2025-04-03 DIAGNOSIS — J30.1 SEASONAL ALLERGIC RHINITIS DUE TO POLLEN: ICD-10-CM

## 2025-04-03 DIAGNOSIS — Z97.3 WEARS GLASSES: ICD-10-CM

## 2025-04-03 DIAGNOSIS — E78.5 HYPERLIPIDEMIA, UNSPECIFIED HYPERLIPIDEMIA TYPE: ICD-10-CM

## 2025-04-03 DIAGNOSIS — N40.1 BPH WITH OBSTRUCTION/LOWER URINARY TRACT SYMPTOMS: ICD-10-CM

## 2025-04-03 DIAGNOSIS — F10.21 ALCOHOLISM IN REMISSION (MULTI): ICD-10-CM

## 2025-04-03 DIAGNOSIS — F41.8 SITUATIONAL ANXIETY: ICD-10-CM

## 2025-04-03 DIAGNOSIS — E55.9 VITAMIN D DEFICIENCY: ICD-10-CM

## 2025-04-03 DIAGNOSIS — I10 ESSENTIAL HYPERTENSION: Primary | ICD-10-CM

## 2025-04-03 DIAGNOSIS — Z12.5 SCREENING FOR PROSTATE CANCER: ICD-10-CM

## 2025-04-03 DIAGNOSIS — R01.1 HEART MURMUR, SYSTOLIC: ICD-10-CM

## 2025-04-03 DIAGNOSIS — E78.5 DYSLIPIDEMIA, GOAL LDL BELOW 70: ICD-10-CM

## 2025-04-03 DIAGNOSIS — N13.8 BPH WITH OBSTRUCTION/LOWER URINARY TRACT SYMPTOMS: ICD-10-CM

## 2025-04-03 DIAGNOSIS — E66.3 OVERWEIGHT (BMI 25.0-29.9): ICD-10-CM

## 2025-04-03 PROCEDURE — 1170F FXNL STATUS ASSESSED: CPT | Performed by: INTERNAL MEDICINE

## 2025-04-03 PROCEDURE — 1160F RVW MEDS BY RX/DR IN RCRD: CPT | Performed by: INTERNAL MEDICINE

## 2025-04-03 PROCEDURE — 1036F TOBACCO NON-USER: CPT | Performed by: INTERNAL MEDICINE

## 2025-04-03 PROCEDURE — 99215 OFFICE O/P EST HI 40 MIN: CPT | Performed by: INTERNAL MEDICINE

## 2025-04-03 PROCEDURE — 1159F MED LIST DOCD IN RCRD: CPT | Performed by: INTERNAL MEDICINE

## 2025-04-03 PROCEDURE — 3078F DIAST BP <80 MM HG: CPT | Performed by: INTERNAL MEDICINE

## 2025-04-03 PROCEDURE — G0402 INITIAL PREVENTIVE EXAM: HCPCS | Performed by: INTERNAL MEDICINE

## 2025-04-03 PROCEDURE — 3008F BODY MASS INDEX DOCD: CPT | Performed by: INTERNAL MEDICINE

## 2025-04-03 PROCEDURE — G2211 COMPLEX E/M VISIT ADD ON: HCPCS | Performed by: INTERNAL MEDICINE

## 2025-04-03 PROCEDURE — 3074F SYST BP LT 130 MM HG: CPT | Performed by: INTERNAL MEDICINE

## 2025-04-03 PROCEDURE — 1123F ACP DISCUSS/DSCN MKR DOCD: CPT | Performed by: INTERNAL MEDICINE

## 2025-04-03 RX ORDER — TADALAFIL 5 MG/1
5 TABLET ORAL DAILY
Qty: 90 TABLET | Refills: 11 | Status: SHIPPED | OUTPATIENT
Start: 2025-04-03

## 2025-04-03 RX ORDER — METFORMIN HYDROCHLORIDE 500 MG/1
500 TABLET, EXTENDED RELEASE ORAL
Status: SHIPPED | COMMUNITY
Start: 2025-04-03

## 2025-04-03 RX ORDER — FINASTERIDE 5 MG/1
5 TABLET, FILM COATED ORAL DAILY
Qty: 90 TABLET | Refills: 1 | Status: SHIPPED | OUTPATIENT
Start: 2025-04-03 | End: 2026-04-03

## 2025-04-03 RX ORDER — TAMSULOSIN HYDROCHLORIDE 0.4 MG/1
0.4 CAPSULE ORAL DAILY
Qty: 90 CAPSULE | Refills: 3 | Status: SHIPPED | OUTPATIENT
Start: 2025-04-03 | End: 2026-04-03

## 2025-04-03 ASSESSMENT — ENCOUNTER SYMPTOMS
POLYPHAGIA: 0
WEIGHT LOSS: 1
VISUAL CHANGE: 1
HEADACHES: 0
SWEATS: 0
BLACKOUTS: 0
TREMORS: 0
FATIGUE: 0
NERVOUS/ANXIOUS: 1
SEIZURES: 0
SPEECH DIFFICULTY: 0
POLYDIPSIA: 0
DIZZINESS: 0
CONFUSION: 0

## 2025-04-03 ASSESSMENT — VISUAL ACUITY
OD_CC: 20/25
OS_CC: 20/30

## 2025-04-03 NOTE — PROGRESS NOTES
Subjective   Patient ID: Ronny Read is a 69 y.o. male who presents for a [place] to Medicare visit and follow-up visit.    Here for follow up and wellness visit.  Overall doing well for the most part.    Overall doing well.  He is working to lose weight.  At lunch he eats a low-carb Ensure bottle.  He does not eat any breakfast.  At dinner, his wife has been helping him fix chicken or lean pork, and steamed vegetables which she gets frozen.  He also has cottage cheese.    His parents have passed away since last visit-his mother passed away in January, his father in mid November he was 91.  His sister passed away last month.  She had issues with severe obesity he notes    Diabetes  He has type 2 diabetes mellitus. No MedicAlert identification noted. Hypoglycemia symptoms include nervousness/anxiousness. Pertinent negatives for hypoglycemia include no confusion, dizziness, headaches, mood changes, pallor, seizures, sleepiness, speech difficulty, sweats or tremors. Associated symptoms include polyuria, visual change and weight loss. Pertinent negatives for diabetes include no chest pain, no fatigue, no foot paresthesias, no foot ulcerations, no polydipsia and no polyphagia. Pertinent negatives for hypoglycemia complications include no blackouts, no hospitalization, no nocturnal hypoglycemia, no required assistance and no required glucagon injection. Symptoms are improving. Pertinent negatives for diabetic complications include no CVA, heart disease, impotence, nephropathy, peripheral neuropathy, PVD or retinopathy. Risk factors for coronary artery disease include family history. Current diabetic treatment includes oral agent (monotherapy). He is compliant with treatment most of the time. His weight is decreasing steadily. He is following a generally healthy diet. Meal planning includes avoidance of concentrated sweets and carbohydrate counting. He has not had a previous visit with a dietitian. He never  "participates in exercise. He monitors blood glucose at home 3-4 x per day. He monitors urine at home <1 x per month. Blood glucose monitoring compliance is adequate. His home blood glucose trend is decreasing steadily. His breakfast blood glucose range is generally 110-130 mg/dl. His lunch blood glucose is taken between 12-1 pm. His lunch blood glucose range is generally  mg/dl. His dinner blood glucose is taken between 6-7 pm. His dinner blood glucose range is generally 110-130 mg/dl. His bedtime blood glucose is taken between 9-10 pm. His bedtime blood glucose range is generally  mg/dl. His overall blood glucose range is 110-130 mg/dl. He does not see a podiatrist.Eye exam is current.        Review of Systems   Constitutional:  Positive for weight loss. Negative for fatigue.   Cardiovascular:  Negative for chest pain.   Endocrine: Positive for polyuria. Negative for polydipsia and polyphagia.   Genitourinary:  Negative for impotence.   Skin:  Negative for pallor.   Neurological:  Negative for dizziness, tremors, seizures, speech difficulty and headaches.   Psychiatric/Behavioral:  Negative for confusion. The patient is nervous/anxious.        Objective   /78   Pulse (!) 49   Ht 1.736 m (5' 8.35\")   Wt 83.1 kg (183 lb 3.2 oz)   SpO2 98%   BMI 27.57 kg/m²     Physical Exam  Vitals reviewed.   Constitutional:       Appearance: Normal appearance.   HENT:      Head: Normocephalic and atraumatic.      Right Ear: Tympanic membrane normal.      Left Ear: Tympanic membrane normal.   Eyes:      General: No scleral icterus.        Right eye: No discharge.         Left eye: No discharge.      Extraocular Movements: Extraocular movements intact.      Conjunctiva/sclera: Conjunctivae normal.      Pupils: Pupils are equal, round, and reactive to light.   Cardiovascular:      Rate and Rhythm: Normal rate and regular rhythm.      Pulses: Normal pulses.      Heart sounds: Murmur heard.   Pulmonary:      " Effort: Pulmonary effort is normal.      Breath sounds: Normal breath sounds. No wheezing or rhonchi.   Musculoskeletal:         General: No deformity or signs of injury. Normal range of motion.      Cervical back: Normal range of motion and neck supple. No rigidity or tenderness.   Lymphadenopathy:      Cervical: No cervical adenopathy.   Skin:     General: Skin is warm and dry.      Findings: No rash.   Neurological:      General: No focal deficit present.      Mental Status: He is alert and oriented to person, place, and time. Mental status is at baseline.      Cranial Nerves: No cranial nerve deficit.      Sensory: No sensory deficit.      Gait: Gait normal.   Psychiatric:         Mood and Affect: Mood normal.         Behavior: Behavior normal.         Thought Content: Thought content normal.         Judgment: Judgment normal.         Assessment/Plan   Problem List Items Addressed This Visit             ICD-10-CM    Allergic rhinitis J30.9    BPH with obstruction/lower urinary tract symptoms N40.1, N13.8    Relevant Medications    tadalafil (Cialis) 5 mg tablet    finasteride (Proscar) 5 mg tablet    tamsulosin (Flomax) 0.4 mg 24 hr capsule    Essential hypertension - Primary I10    Dyslipidemia, goal LDL below 70 E78.5    Erectile disorder due to medical condition in male N52.1    Relevant Medications    tadalafil (Cialis) 5 mg tablet    Overweight (BMI 25.0-29.9) E66.3    Situational anxiety F41.8    Vitamin D deficiency E55.9    Wears glasses Z97.3    Heart murmur, systolic R01.1    Alcoholism in remission (Multi) F10.21     Other Visit Diagnoses         Codes    Type 2 diabetes mellitus without complication, without long-term current use of insulin     E11.9    Relevant Medications    metFORMIN XR (Glucophage-XR) 500 mg 24 hr tablet    Other Relevant Orders    Hemoglobin A1C    Basic Metabolic Panel    Hyperlipidemia, unspecified hyperlipidemia type     E78.5    Relevant Orders    Lipid Panel    Alanine  Aminotransferase    Screening for prostate cancer     Z12.5    Relevant Orders    Prostate Specific Antigen               Portions of this encounter note have been copied from my previous note dated  11/14/24 , which have been updated where appropriate and all reflect my current medical decision making from today.     We spoke over 35 minutes, over half the time counseling.  I spent over 5 additional minutes on documentation    Labs from 3/28/25 reviewed    EKG suggested/declined       Living situation - he and his wife and dog live in a 2 story house - bedroom and home office on the 2nd floor, laundry and dog food in the basement.  He still works full-time, from home     Elevated blood pressure/edema-he has in the past had lightheadedness with a blood pressure medicine-and has refused any additional blood pressure medicine since.    He will continue statin and efforts at weight loss.          Goal LDL < 100;                Sep '23 - LDL 77 unfortunately his weight stable at BMI 33 from a year ago.  Fortunately blood pressure came down a bit on recheck but it was pretty high initially.  Discussed elevated blood pressure-at this point he still refuses to consider medications but I told him eventually he will need to reconsider medications as his blood pressure will increase with aging                      Developing diabetes/elevated weight-            5/24-annual labs ordered.  BMI 33.6.  He will continue diet and walking efforts            11/24-  BMI; 31.9 -11/24   weight is down 6 pounds in 6 months  routine have advanced into the diabetic range.  He will continue weight loss efforts.  He will begin metformin 500 twice daily.  Side effects reviewed.  Nutritional consultant suggested/declined-he states he knows what to do about carb restriction.  Will consider home glucometer assessment next time if A1c not significantly better.  Will plan to recheck A1c in 3 months and follow-up in 4 months           4/25-he has  been watching his glucose monitor at home-90-day average 113, 30-day average 111, 14-day average 112.  His weight is down 29 pounds in 4-1/2 months since his last office visit.  He has been on a strict diet, no breakfast, a low-carb Ensure at lunch, and a healthy dinner with either chicken or pork, [place]enc[place] vegetables that he gets frozen, and cottage cheese he will continue weight loss efforts.  Encouraged adding exercise and           A1c:   6.2% Mar ' 25, improved  7.1%-11/24  Foot Exam:   Eye Exam:     Statin:  Yes  Lipid Panel: (goal LDL <70) LDL 53-11/24    Urine Albumin:     Influenza Immunization: 10/23  Pneumonia Immunization: 4/23      Dyslipidemia-goal LDL under 70          11/23-LDL 53            4/25-lipids ordered    Heart murmur-        4/25-innocent heart murmur noted on exam.  Will consider echo at some point      Exercise routine-not doing much at this time.  Encouraged working to get into her dog walking routine now that he has a new puppy           Walking regularly encouraged    History of alcoholism-he remains abstinent    Anxiety-gabapentin has been very helpful-he will continue     vitamin D deficiency            11/24-vitamin D low again at 27 off vitamin D.  Encouraged him to restart this 2000 units daily consistently      Right knee arthritis-moderate on x-ray-initial steroid injection not helpful.  Still occasionally sore.  He will follow-up with his orthopedist to consider gel injections.  He will see Dr. Hampton as needed          Right knee a bit better with weight loss.  Encouraged walking    Osteoarthritis-hand stiffness-consistent with osteoarthritis with Heberden's nodes and several DIP joints.  Encouraged heat and heating pad in the morning    Lumbar arthritis-his gait suggest this with his forward flexion stance.  Fortunately not problematic.    Peripheral neuropathy-some burning symptoms at night in his feet-discussed the likelihood of alcoholic related neuropathy.  Fortunately he  has discontinued alcohol.  He will continue gabapentin    Colon polyp - updated , next in 5 yrs -               BPH with LUTS /  lower urinary tract symptoms-annual PSA continues for prostate cancer screening. Nocturia not a present concern with his Cialis 5 mg daily.  Daily Cialis helpful with his symptoms as started by his urologist some 3 years ago.           PSA ok -annual PSA to be done this .  His insurance this year with his Medicare formulary has denied the 5 mg daily Cialis which has been helpful for him now several years.  They have requested that he try both an alpha-blocker such as tamsulosin and a 5 alpha reductase inhibitor such as finasteride.  I ordered these.  He will use these.  He will notify me with concerns.  His insurance paperwork notes that if he has tried these medications from 2 classes, and he cannot tolerate these or if they are not helpful in alleviating his lower urinary tract symptoms,              Allergic rhinitis-reviewed importance of using allergy medications as above consistently during their allergy season(s), and call if allergy symptoms are not well controlled on this regimen.    Seborrheic Dermatitis - [facility]  - ketoconazole 2% shampoo provided still somewhat irritated.  He will [place] back accordingly    Seborrheic keratoses - s/p LN .  He will follow-up with any concerning lesions    Ear eczema - prn fluocinolone cream helps.           -refilled    Glasses - Hx bilat cataracts. new reading glasses summer '23          Encouraged annual eye exam    Dental care-encouraged semiannual dental visits.      Bereavement -his father  mid 2024 after a fall.  His mother stopped medications and  about 8 weeks later early in .  His sister, overweight passed away in mid March at the age of 70.  She was overweight.             Support provided with all of these losses    Shingrix-encouraged-he will do this at  his pharmacy at some point.  Order provided          10/23 -Not yet done. Encouraged him to do    Prevnar 20-updated Apr '23    Flu shot each fall; updated 10/6/23    Covid booster - encouraged each fall      Follow-up 4 months, after an A1c in 3 months, sooner as needed    Charting was completed using voice recognition technology and may include unintended errors.

## 2025-04-07 PROBLEM — E66.811 CLASS 1 OBESITY DUE TO EXCESS CALORIES WITH SERIOUS COMORBIDITY AND BODY MASS INDEX (BMI) OF 31.0 TO 31.9 IN ADULT: Status: RESOLVED | Noted: 2024-11-15 | Resolved: 2025-04-07

## 2025-04-07 PROBLEM — N52.1 ERECTILE DISORDER DUE TO MEDICAL CONDITION IN MALE: Status: ACTIVE | Noted: 2023-02-17

## 2025-04-07 PROBLEM — F10.10 ALCOHOL ABUSE: Status: RESOLVED | Noted: 2023-02-17 | Resolved: 2025-04-07

## 2025-04-07 PROBLEM — E66.09 CLASS 1 OBESITY DUE TO EXCESS CALORIES WITH SERIOUS COMORBIDITY AND BODY MASS INDEX (BMI) OF 31.0 TO 31.9 IN ADULT: Status: RESOLVED | Noted: 2024-11-15 | Resolved: 2025-04-07

## 2025-04-07 PROBLEM — F10.21 ALCOHOLISM IN REMISSION (MULTI): Status: ACTIVE | Noted: 2025-04-07

## 2025-04-07 ASSESSMENT — ACTIVITIES OF DAILY LIVING (ADL)
ADEQUATE_TO_COMPLETE_ADL: YES
FEEDING YOURSELF: INDEPENDENT
TOILETING: INDEPENDENT
DRESSING YOURSELF: INDEPENDENT
HEARING - RIGHT EAR: FUNCTIONAL
BATHING: INDEPENDENT
WALKS IN HOME: INDEPENDENT
ASSISTIVE_DEVICE: EYEGLASSES
PATIENT'S MEMORY ADEQUATE TO SAFELY COMPLETE DAILY ACTIVITIES?: YES
HEARING - LEFT EAR: FUNCTIONAL
JUDGMENT_ADEQUATE_SAFELY_COMPLETE_DAILY_ACTIVITIES: YES
GROOMING: INDEPENDENT

## 2025-04-07 ASSESSMENT — ENCOUNTER SYMPTOMS
POLYPHAGIA: 0
VISUAL CHANGE: 1
BLACKOUTS: 0
WEIGHT LOSS: 1
SEIZURES: 0
FATIGUE: 0
NERVOUS/ANXIOUS: 1
CONFUSION: 0
SWEATS: 0
DIZZINESS: 0
TREMORS: 0
POLYDIPSIA: 0
HEADACHES: 0
SPEECH DIFFICULTY: 0

## 2025-04-29 DIAGNOSIS — F41.8 SITUATIONAL ANXIETY: Chronic | ICD-10-CM

## 2025-04-30 ENCOUNTER — TELEPHONE (OUTPATIENT)
Dept: PRIMARY CARE | Facility: CLINIC | Age: 70
End: 2025-04-30
Payer: MEDICARE

## 2025-04-30 RX ORDER — GABAPENTIN 300 MG/1
300 CAPSULE ORAL 3 TIMES DAILY
Qty: 270 CAPSULE | Refills: 0 | Status: SHIPPED | OUTPATIENT
Start: 2025-04-30

## 2025-04-30 NOTE — TELEPHONE ENCOUNTER
Will submit appeal for tadalafil to [employer]. Paperwork printed and given to Dr. Hoffmann for signature. Will fax once completed.

## 2025-05-15 ENCOUNTER — TELEPHONE (OUTPATIENT)
Dept: PRIMARY CARE | Facility: CLINIC | Age: 70
End: 2025-05-15
Payer: MEDICARE

## 2025-05-15 NOTE — TELEPHONE ENCOUNTER
Pt looking for update on appeal for script for  tadalafil - states he doesn't see anything online with insurance that this is being processed - please advise

## 2025-05-16 NOTE — TELEPHONE ENCOUNTER
Appeal was made on 5/14/25 with [email or web address] for Tadalafil 5 mg. Called [employer] today at [phone] and was given appeal # [id number]. Notified that appeal was still under review and a response will be sent within 7 business days from appeal date, 5/14/25. Spoke with patient and notified him. Will follow up with patient once response is sent by [employer].

## 2025-05-19 DIAGNOSIS — N40.1 BPH WITH OBSTRUCTION/LOWER URINARY TRACT SYMPTOMS: ICD-10-CM

## 2025-05-19 DIAGNOSIS — N52.1 ERECTILE DISORDER DUE TO MEDICAL CONDITION IN MALE: ICD-10-CM

## 2025-05-19 DIAGNOSIS — N13.8 BPH WITH OBSTRUCTION/LOWER URINARY TRACT SYMPTOMS: ICD-10-CM

## 2025-05-19 RX ORDER — TADALAFIL 5 MG/1
5 TABLET ORAL DAILY
Qty: 90 TABLET | Refills: 0 | Status: SHIPPED | OUTPATIENT
Start: 2025-05-19

## 2025-10-20 ENCOUNTER — APPOINTMENT (OUTPATIENT)
Dept: PRIMARY CARE | Facility: CLINIC | Age: 70
End: 2025-10-20
Payer: MEDICARE

## 2025-11-20 ENCOUNTER — APPOINTMENT (OUTPATIENT)
Dept: PRIMARY CARE | Facility: CLINIC | Age: 70
End: 2025-11-20
Payer: COMMERCIAL

## 2026-04-13 ENCOUNTER — APPOINTMENT (OUTPATIENT)
Dept: PRIMARY CARE | Facility: CLINIC | Age: 71
End: 2026-04-13
Payer: MEDICARE